# Patient Record
Sex: FEMALE | Race: BLACK OR AFRICAN AMERICAN | Employment: UNEMPLOYED | URBAN - METROPOLITAN AREA
[De-identification: names, ages, dates, MRNs, and addresses within clinical notes are randomized per-mention and may not be internally consistent; named-entity substitution may affect disease eponyms.]

---

## 2018-09-16 ENCOUNTER — HOSPITAL ENCOUNTER (EMERGENCY)
Age: 38
Discharge: HOME | End: 2018-09-16
Attending: EMERGENCY MEDICINE
Payer: MEDICARE

## 2018-09-16 ENCOUNTER — APPOINTMENT (OUTPATIENT)
Dept: GENERAL RADIOLOGY | Age: 38
End: 2018-09-16
Attending: EMERGENCY MEDICINE
Payer: MEDICARE

## 2018-09-16 VITALS
WEIGHT: 215 LBS | HEART RATE: 76 BPM | SYSTOLIC BLOOD PRESSURE: 189 MMHG | DIASTOLIC BLOOD PRESSURE: 103 MMHG | RESPIRATION RATE: 16 BRPM | TEMPERATURE: 98.2 F | HEIGHT: 64 IN | OXYGEN SATURATION: 100 % | BODY MASS INDEX: 36.7 KG/M2

## 2018-09-16 DIAGNOSIS — S86.811A PATELLAR TENDON RUPTURE, RIGHT, INITIAL ENCOUNTER: Primary | ICD-10-CM

## 2018-09-16 PROCEDURE — 73562 X-RAY EXAM OF KNEE 3: CPT

## 2018-09-16 PROCEDURE — 99284 EMERGENCY DEPT VISIT MOD MDM: CPT

## 2018-09-16 PROCEDURE — 74011250637 HC RX REV CODE- 250/637: Performed by: EMERGENCY MEDICINE

## 2018-09-16 PROCEDURE — L1830 KO IMMOB CANVAS LONG PRE OTS: HCPCS

## 2018-09-16 RX ORDER — CARVEDILOL 25 MG/1
25 TABLET ORAL 2 TIMES DAILY WITH MEALS
Status: ON HOLD | COMMUNITY
End: 2019-05-22

## 2018-09-16 RX ORDER — HYDROCODONE BITARTRATE AND ACETAMINOPHEN 5; 325 MG/1; MG/1
1 TABLET ORAL
Status: DISCONTINUED | OUTPATIENT
Start: 2018-09-16 | End: 2018-09-16 | Stop reason: HOSPADM

## 2018-09-16 RX ORDER — CARVEDILOL 12.5 MG/1
25 TABLET ORAL ONCE
Status: COMPLETED | OUTPATIENT
Start: 2018-09-16 | End: 2018-09-16

## 2018-09-16 RX ORDER — CLONIDINE HYDROCHLORIDE 0.1 MG/1
0.3 TABLET ORAL
Status: COMPLETED | OUTPATIENT
Start: 2018-09-16 | End: 2018-09-16

## 2018-09-16 RX ORDER — HYDROCODONE BITARTRATE AND ACETAMINOPHEN 5; 325 MG/1; MG/1
1 TABLET ORAL
Qty: 12 TAB | Refills: 0 | Status: SHIPPED | OUTPATIENT
Start: 2018-09-16 | End: 2018-09-17

## 2018-09-16 RX ORDER — RAMIPRIL 5 MG/1
5 CAPSULE ORAL DAILY
Status: DISCONTINUED | OUTPATIENT
Start: 2018-09-16 | End: 2018-09-16 | Stop reason: HOSPADM

## 2018-09-16 RX ADMIN — CLONIDINE HYDROCHLORIDE 0.3 MG: 0.1 TABLET ORAL at 07:31

## 2018-09-16 RX ADMIN — RAMIPRIL 5 MG: 5 CAPSULE ORAL at 07:34

## 2018-09-16 RX ADMIN — CARVEDILOL 25 MG: 12.5 TABLET, FILM COATED ORAL at 07:33

## 2018-09-16 NOTE — ED TRIAGE NOTES
Presented to ED via EMS to be evaluated for reported fall with injury to bilateral knees. Patient reports pain as documented. Patient states seen at that time at [facility] and was told to follow up with ortho. Patient denies any additional complaints at this time. Patient reports daily dialysis at home. Patient denies any complaints concerning dialysis. Patient is able to speak in complete sentences with s/s distress noted. Sepsis Screening completed (  )Patient meets SIRS criteria. ( x )Patient does not meet SIRS criteria. SIRS Criteria is achieved when two or more of the following are present ? Temperature < 96.8°F (36°C) or > 100.9°F (38.3°C) ? Heart Rate > 90 beats per minute ? Respiratory Rate > 20 breaths per minute ? WBC count > 12,000 or <4,000 or > 10% bands

## 2018-09-16 NOTE — ED PROVIDER NOTES
EMERGENCY DEPARTMENT HISTORY AND PHYSICAL EXAM 
 
Date: 9/16/2018 Patient Name: Gilmer Matos History of Presenting Illness Chief Complaint Patient presents with  Knee Pain History Provided By: Patient Chief Complaint: Bilateral knee pain Duration: 3 Days Timing:  Acute Location: Bilateral knees, worse on right Severity: 7 out of 10 Modifying Factors: No alleviation with ice. Associated Symptoms: gait problem (unable to bear weight) and bilateral knee swelling Additional History (Context):  
7:10 AM 
Gilmer Matos is a 41 y.o. female with PMHX of HTN, GERD, Lupus, RA, and ESRD who presents to the emergency department C/O bilateral knee pain (7/10) worse on the right onset 3 days ago s/p fall. Associated sxs include gait problem (unable to bear weight) and bilateral knee swelling. No alleviation with ice. Pt was seen at [facility] after the fall and had XR's at that time. States she was told she may have had a patella rupture and to follow up with an orthopedist to have an MRI. Pt is up to date with tetanus. Pt is on dialysis due to her lupus. Pt has surgery scheduled at this facility in 4 days for a \"parathyroidectomy. \" Pt has not taken her HTN medication this morning. Pt denies fever, chills, chest pain, SOB, additional falls, and any other sxs or complaints. PCP: Allyssa Wick MD 
 
Current Facility-Administered Medications Medication Dose Route Frequency Provider Last Rate Last Dose  ramipril (ALTACE) capsule 5 mg  5 mg Oral DAILY Iris Kwong MD   5 mg at 09/16/18 0734  
 HYDROcodone-acetaminophen (NORCO) 5-325 mg per tablet 1 Tab  1 Tab Oral NOW Iris Kwong MD      
 HYDROcodone-acetaminophen [facility]) 5-325 mg per tablet 1 Tab  1 Tab Oral NOW Iris Kwong MD      
 
Current Outpatient Prescriptions Medication Sig Dispense Refill  carvedilol (COREG) 25 mg tablet Take 25 mg by mouth two (2) times daily (with meals).  HYDROcodone-acetaminophen (NORCO) 5-325 mg per tablet Take 1 Tab by mouth every six (6) hours as needed for Pain for up to 3 days. Max Daily Amount: 4 Tabs. 12 Tab 0  
 sevelamer carbonate (RENVELA) 800 mg tab tab Take 800 mg by mouth three (3) times daily (with meals).  mycophenolate (CELLCEPT) 500 mg tablet Take 500 mg by mouth two (2) times a day. Indications: Lupus  omeprazole (PRILOSEC) 40 mg capsule Take 40 mg by mouth daily. Pt instructed to take am of surgery. Indications: GASTROESOPHAGEAL REFLUX  cloNIDine (CATAPRES) 0.3 mg tablet Take 0.3 mg by mouth nightly. Indications: HYPERTENSION    
 ramipril (ALTACE) 5 mg capsule Take 5 mg by mouth daily. Pt instructed to take am of surgery. Indications: HYPERTENSION  furosemide (LASIX) 40 mg tablet Take 40 mg by mouth daily. Indications: EDEMA  paricalcitol (ZEMPLAR) 2 mcg/mL injection by IntraVENous route DIALYSIS ONE TIME DOSE. Twice per week. Indications: HYPERPARATHYROIDISM SECONDARY TO CHRONIC RENAL FAILURE Past History Past Medical History: 
Past Medical History:  
Diagnosis Date  Arthritis RA  Autoimmune disease ([facility] [address] Lupus, RA  
 Bartholin cyst   
 Chronic kidney disease 2013 ESRD- related to the lupus, periteneal dialysis  GERD (gastroesophageal reflux disease)  Hypertension 2008 Past Surgical History: 
Past Surgical History:  
Procedure Laterality Date  HX OTHER SURGICAL    
 periteneal cather placement  HX OTHER SURGICAL Dialysis port placed and removed  HX OTHER SURGICAL    
 kidney biopsy  HX TONSILLECTOMY Family History: 
History reviewed. No pertinent family history. Social History: 
Social History Substance Use Topics  Smoking status: Never Smoker  Smokeless tobacco: Never Used  Alcohol use Yes Comment: once every 6 months Allergies: 
No Known Allergies Review of Systems Review of Systems Constitutional: Negative for chills and fever. Respiratory: Negative for shortness of breath. Cardiovascular: Negative for chest pain. Musculoskeletal: Positive for arthralgias (bilateral knee), gait problem (unable to bear weight) and joint swelling (bilateral knees). All other systems reviewed and are negative. Physical Exam  
 
Vitals:  
 09/16/18 0641 09/16/18 0731 09/16/18 0734 09/16/18 0830 BP: (!) 248/124 (!) 245/118 (!) 239/116 (!) 218/108 Pulse: 82 79 79 80 Resp: 14 Temp: 98.2 °F (36.8 °C) SpO2: 100%   100% Weight: 97.5 kg (215 lb) Height: 5' 4\" (1.626 m) Physical Exam  
Constitutional: She is oriented to person, place, and time. She appears well-developed and well-nourished. HENT:  
Head: Normocephalic and atraumatic. Right Ear: External ear normal.  
Left Ear: External ear normal.  
Nose: Nose normal.  
Mouth/Throat: Oropharynx is clear and moist.  
Eyes: EOM are normal. Pupils are equal, round, and reactive to light. Right conjunctiva has a hemorrhage (lateral subconjunctival hemorrhage). Neck: Normal range of motion. Neck supple. No JVD present. No tracheal deviation present. Cardiovascular: Normal rate, regular rhythm, normal heart sounds and intact distal pulses. Exam reveals no gallop and no friction rub. No murmur heard. Pulmonary/Chest: Effort normal and breath sounds normal. No respiratory distress. She has no wheezes. She has no rales. Abdominal: Soft. Bowel sounds are normal. She exhibits no distension and no mass. There is no tenderness. There is no rebound and no guarding. Musculoskeletal: Normal range of motion. She exhibits no edema or tenderness. Right knee: She exhibits swelling (anterior) and effusion. Abrasion to anterior right knee. Neurovascularly intact. Right anterior knee with effusion and tenderness. Decreased ROM secondary to pain and swelling. Neurological: She is alert and oriented to person, place, and time. She has normal reflexes. No cranial nerve deficit. She exhibits normal muscle tone. Coordination normal.  
Distal sensation intact to light touch and position sense. Skin: Skin is warm and dry. No rash noted. Psychiatric: She has a normal mood and affect. Her behavior is normal.  
Nursing note and vitals reviewed. Diagnostic Study Results Labs - No results found for this or any previous visit (from the past 12 hour(s)). Radiologic Studies -  
XR PATELLA RT 3 V Final Result IMPRESSION: 
 
 
1. Prepatellar and infrapatellar soft tissue swelling that may represent 
cellulitis in the appropriate clinical setting. 2. Nonspecific changes of the inferior patella. Diagnostic considerations 
include degenerative changes. If there is chronic infection, osteomyelitis 
should also be considered. As read by the radiologist.  
 
CT Results  (Last 48 hours) None CXR Results  (Last 48 hours) None Medications given in the ED- Medications  
ramipril (ALTACE) capsule 5 mg (5 mg Oral Given 9/16/18 0734) HYDROcodone-acetaminophen (NORCO) 5-325 mg per tablet 1 Tab (1 Tab Oral Incomplete 9/16/18 0907) HYDROcodone-acetaminophen (NORCO) 5-325 mg per tablet 1 Tab (not administered)  
cloNIDine HCl (CATAPRES) tablet 0.3 mg (0.3 mg Oral Given 9/16/18 0731)  
carvedilol (COREG) tablet 25 mg (25 mg Oral Given 9/16/18 0733) Medical Decision Making I am the first provider for this patient. I reviewed the vital signs, available nursing notes, past medical history, past surgical history, family history and social history. Vital Signs-Reviewed the patient's vital signs. Pulse Oximetry Analysis - 100% on RA Records Reviewed: Nursing Notes and Old Medical Records Provider Notes (Medical Decision Making):  
INITIAL CLINICAL IMPRESSION and PLANS: 
 The patient presents with the primary complaint(s) of: knee pain. The presentation, to include historical aspects and clinical findings are consistent with the DX of fracture. However, other possible DX's to consider as primary, associated with, or exacerbated by include: 1. Dislocation 2. Contusion 3. Effusion Considering the above, my initial management plan to evaluate and therapeutic interventions include the following and as noted in the orders: 
 
1. Imaging: XR Patelle RT 
2. Knee immobilizer and Crutches Procedures: 
Splint, Other 
Date/Time: 9/17/2018 9:10 AM 
Performed by: Jessica Menchaca Authorized by: Jessica Menchaca Consent:  
  Consent obtained:  Verbal 
  Consent given by:  Patient Pre-procedure details:  
  Sensation:  Normal 
Procedure details:  
  Laterality:  Right Location:  Knee Knee:  R knee Splint type:  Knee immobilizer Post-procedure details:  
  Pain:  Improved Sensation:  Normal 
  Patient tolerance of procedure: Tolerated well, no immediate complications Comments:  
   Patient ambulatory with right knee immobilizer and walker. ED Course:  
7:10 AM Initial assessment performed. The patients presenting problems have been discussed, and they are in agreement with the care plan formulated and outlined with them. I have encouraged them to ask questions as they arise throughout their visit. 8:44 AM Discussed patient's history, exam, and available diagnostics results with VIBHA Keita for Kolby Ventura MD, Orthopedic Surgeon, who states to place her in a knee immobilizer and giver her some crutches. Will see her in the office on 9/19 or 9/20.  
 
9:12 AM Discussed patient's history, exam, and available diagnostics results with Betzy Proctor for Kolby Ventura MD, Orthopedic Surgeon, who states to have the pt follow up in the office tomorrow. 9:20 AM Pt was unable to bear weight for an extended period of time.  Will give another dose of pain medication and reassess. 9:52 AM Pt is able to ambulate with a walker. Will discharge. Diagnosis and Disposition Discussion: 
41 y.o female presents with complaints of right knee pain. Unable to bear weight. [facility] were reviewed, noting effusion with patella tendon rupture. XR here shows similar findings of effusion and no evidence of fracture. Case was discussed with PA for Rafael Lofton MD (Orthopedic Surgeon), who was in contact with Dr. Mari Lcoo. Recommended knee immobilizer and follow up in the office tomorrow. Pt was placed in right knee immobilizer. DNVI. DISCHARGE NOTE: 
9:15 AM 
Deshawn Kingsley's  results have been reviewed with her. She has been counseled regarding her diagnosis, treatment, and plan. She verbally conveys understanding and agreement of the signs, symptoms, diagnosis, treatment and prognosis and additionally agrees to follow up as discussed. She also agrees with the care-plan and conveys that all of her questions have been answered. I have also provided discharge instructions for her that include: educational information regarding their diagnosis and treatment, and list of reasons why they would want to return to the ED prior to their follow-up appointment, should her condition change. She has been provided with education for proper emergency department utilization. CLINICAL IMPRESSION: 
 
1. Patellar tendon rupture, right, initial encounter PLAN: 
1. D/C Home 2. Current Discharge Medication List  
  
START taking these medications Details HYDROcodone-acetaminophen (NORCO) 5-325 mg per tablet Take 1 Tab by mouth every six (6) hours as needed for Pain for up to 3 days. Max Daily Amount: 4 Tabs. Qty: 12 Tab, Refills: 0 Associated Diagnoses: Patellar tendon rupture, right, initial encounter 3. Follow-up Information Follow up With Details Comments Contact Info Federica Abrams MD Go in 1 day Go to tomorrow for orthopedic follow up. [address] [facility] [address] [address] 
[phone] [facility] EMERGENCY DEPT Go to As needed, If symptoms worsen [address] [address] 
[phone]  
  
 
_______________________________ Attestations: This note is prepared by Susan Adames, acting as Scribe for Flower Munoz MD. 
 
Flower Munoz MD:  The scribe's documentation has been prepared under my direction and personally reviewed by me in its entirety. I confirm that the note above accurately reflects all work, treatment, procedures, and medical decision making performed by me. 
_______________________________

## 2018-09-16 NOTE — ED NOTES
Knee immobilizer applied to right leg. Patient was instructed on how to adjust and care for immobilizer. Patient was assisted to sitting position with legs dangling. Patient verbalized significant pain in the left knee with bending and dangling. Patient says knee immobilizer is comfortable. Patient reports she has a walker at home. This RN suggested patient use the walker for greater safety. Patient reported that she fell at [facility] while using crutches, then \"they had a big desean put me in a wheelchair and take me out\".

## 2018-09-16 NOTE — ED NOTES
Patient was assisted from stretcher to [facility] then from [facility] to toilet. Patient is unable to bear weight and slides herself from surface to surface.

## 2018-09-16 NOTE — DISCHARGE INSTRUCTIONS
Tendon Injury (Tendinopathy): Care Instructions  Your Care Instructions    Tendons are tough, flexible tissues that connect muscle to bone. A tendon can hurt or get torn from overuse or aging, especially tendons in the shoulder, elbow, wrist, hip, knee, or ankle. Tendon injuries may be called tendinopathy or tendinitis. Tendon injuries can occur from any motion you have to repeat in a job, sports, or daily activities. Tennis elbow is one common tendon injury. You can treat most tendon problems with over-the-counter pain medicine, rest, changes in your activities, and physical therapy. Follow-up care is a key part of your treatment and safety. Be sure to make and go to all appointments, and call your doctor if you are having problems. It's also a good idea to know your test results and keep a list of the medicines you take. How can you care for yourself at home? · Rest the sore area. You may have to stop doing the activity that caused the tendon pain for a while. · Take an over-the-counter pain medicine, such as acetaminophen (Tylenol), ibuprofen (Advil, Motrin), or naproxen (Aleve). Read and follow all instructions on the label. · Do not take two or more pain medicines at the same time unless the doctor told you to. Many pain medicines have acetaminophen, which is Tylenol. Too much acetaminophen (Tylenol) can be harmful. · Put ice or a cold pack on the sore area for 10 to 20 minutes at a time. Try to do this every 1 to 2 hours for the next 3 days (when you are awake) or until any swelling goes down. Put a thin cloth between the ice and your skin. · Prop up the sore area on a pillow when you ice it or anytime you sit or lie down during the next 3 days. Try to keep it above the level of your heart. This will help reduce swelling.   · Follow your doctor's advice for wearing and caring for a sling, splint, or cast. In some cases, you may wear one of these for a while to help your tendon heal.  · Follow your doctor's advice for stretching and physical therapy. Gently move your joint through its full range of motion. This will prevent stiffness in your joint. · Go back to your activity slowly. Warm up before and stretch after the activity. You also can try making some changes. For example, if a sport caused your tendon pain, alternate the sport with another activity. If using a tool causes pain, switch hands or change your . Stop the activity if it hurts. After the activity, apply ice to prevent pain and swelling. · Do not smoke. Smoking can slow healing. If you need help quitting, talk to your doctor about stop-smoking programs and medicines. These can increase your chances of quitting for good. When should you call for help? Watch closely for changes in your health, and be sure to contact your doctor if:    · Your pain gets worse.     · You do not get better as expected. Where can you learn more? Go to http://richie-maura.info/. Enter A157 in the search box to learn more about \"Tendon Injury (Tendinopathy): Care Instructions. \"  Current as of: November 29, 2017  Content Version: 11.7  © 1193-5383 [employer]. Care instructions adapted under license by [employer] (which disclaims liability or warranty for this information). If you have questions about a medical condition or this instruction, always ask your healthcare professional. [address] any warranty or liability for your use of this information.

## 2018-09-16 NOTE — ED NOTES
Band aid to the right knee removed by Dr. Claudetta Sea and replaced with abt ointment and band aid by this RN.

## 2018-09-24 ENCOUNTER — HOSPITAL ENCOUNTER (OUTPATIENT)
Dept: PREADMISSION TESTING | Age: 38
Discharge: HOME | End: 2018-09-24
Payer: MEDICARE

## 2018-09-24 LAB
ALBUMIN SERPL-MCNC: 2.4 G/DL (ref 3.4–5)
ALBUMIN/GLOB SERPL: 0.7 {RATIO} (ref 0.8–1.7)
ALP SERPL-CCNC: 535 U/L (ref 45–117)
ALT SERPL-CCNC: 13 U/L (ref 13–56)
ANION GAP SERPL CALC-SCNC: 9 MMOL/L (ref 3–18)
AST SERPL-CCNC: 13 U/L (ref 15–37)
BILIRUB SERPL-MCNC: 0.4 MG/DL (ref 0.2–1)
BUN SERPL-MCNC: 74 MG/DL (ref 7–18)
BUN/CREAT SERPL: 5 (ref 12–20)
CALCIUM SERPL-MCNC: 9.1 MG/DL (ref 8.5–10.1)
CHLORIDE SERPL-SCNC: 94 MMOL/L (ref 100–108)
CO2 SERPL-SCNC: 24 MMOL/L (ref 21–32)
CREAT SERPL-MCNC: 15.97 MG/DL (ref 0.6–1.3)
ERYTHROCYTE [DISTWIDTH] IN BLOOD BY AUTOMATED COUNT: 18.2 % (ref 11.6–14.5)
GLOBULIN SER CALC-MCNC: 3.3 G/DL (ref 2–4)
GLUCOSE SERPL-MCNC: 82 MG/DL (ref 74–99)
HCT VFR BLD AUTO: 23.2 % (ref 35–45)
HGB BLD-MCNC: 7.5 G/DL (ref 12–16)
MCH RBC QN AUTO: 27.9 PG (ref 24–34)
MCHC RBC AUTO-ENTMCNC: 32.3 G/DL (ref 31–37)
MCV RBC AUTO: 86.2 FL (ref 74–97)
PLATELET # BLD AUTO: 175 K/UL (ref 135–420)
PMV BLD AUTO: 9 FL (ref 9.2–11.8)
POTASSIUM SERPL-SCNC: 5.8 MMOL/L (ref 3.5–5.5)
PROT SERPL-MCNC: 5.7 G/DL (ref 6.4–8.2)
RBC # BLD AUTO: 2.69 M/UL (ref 4.2–5.3)
SODIUM SERPL-SCNC: 127 MMOL/L (ref 136–145)
WBC # BLD AUTO: 7 K/UL (ref 4.6–13.2)

## 2018-09-24 PROCEDURE — 85027 COMPLETE CBC AUTOMATED: CPT | Performed by: ORTHOPAEDIC SURGERY

## 2018-09-24 PROCEDURE — 36415 COLL VENOUS BLD VENIPUNCTURE: CPT | Performed by: ORTHOPAEDIC SURGERY

## 2018-09-24 PROCEDURE — 80053 COMPREHEN METABOLIC PANEL: CPT | Performed by: ORTHOPAEDIC SURGERY

## 2018-09-26 PROBLEM — N28.9 KIDNEY DISEASE: Chronic | Status: ACTIVE | Noted: 2018-09-26

## 2018-09-26 PROBLEM — S86.811A RUPTURE OF RIGHT PATELLAR TENDON: Status: ACTIVE | Noted: 2018-09-26

## 2018-09-26 PROBLEM — M06.9 RHEUMATOID ARTHRITIS (HCC): Chronic | Status: ACTIVE | Noted: 2018-09-26

## 2018-09-26 RX ORDER — DIPHENHYDRAMINE HCL 25 MG
25 CAPSULE ORAL
Status: CANCELLED | OUTPATIENT
Start: 2018-09-26

## 2018-09-26 RX ORDER — SODIUM CHLORIDE 0.9 % (FLUSH) 0.9 %
5-10 SYRINGE (ML) INJECTION EVERY 8 HOURS
Status: CANCELLED | OUTPATIENT
Start: 2018-09-26

## 2018-09-26 RX ORDER — SODIUM CHLORIDE 0.9 % (FLUSH) 0.9 %
5-10 SYRINGE (ML) INJECTION AS NEEDED
Status: CANCELLED | OUTPATIENT
Start: 2018-09-26

## 2018-09-26 NOTE — NURSE NAVIGATOR
Spoke with office concerning abnormal labs and lack of EKG. They were not sure if she had had dialysis done recently. Talked with Dr. Rico Garvey in Anesthesia and would like for her to come in to repeat labs and get EKG done today.

## 2018-09-26 NOTE — NURSE NAVIGATOR
Telephone call received by Vikram) from Dr. Michelle Osman office. Patient does dialysis daily. She is unable to come in to repeat labs. Will arrive at 8 am in morning to have repeat labs.  Aware that case may cancel if abnormal.

## 2018-09-26 NOTE — H&P
History and Physical        Patient: Naheed Rogers               Sex: female          DOA: (Not on file)         YOB: 1980      Age:  41 y.o.        LOS:  LOS: 0 days        HPI:     Joshua Clark is a 41year old right handed  female referred here today for evaluation and treatment of a right knee probable infrapatellar tendon rupture. She did this from a fall, landing on both knees in an outstretched fashion. She sprained the left knee and has ruptured the infrapatellar tendon on the right according to the Emergency Room. She is in a knee immobilizer today and is in the wheelchair. X-rays were reviewed from outside of the right knee show the patient has disrupted the patella tendon off the patella. There appears to be a sleeve of bone that is still with the patella tendon that has come off the patella bone itself. Past Medical History:   Diagnosis Date    Arthritis     RA    Autoimmune disease ([facility] [address]     Lupus, RA    Bartholin cyst     Chronic kidney disease 2013    ESRD- related to the lupus, periteneal dialysis    GERD (gastroesophageal reflux disease)     Hypertension 2008       Past Surgical History:   Procedure Laterality Date    HX OTHER SURGICAL      periteneal cather placement    HX OTHER SURGICAL      Dialysis port placed and removed    HX OTHER SURGICAL      kidney biopsy    HX TONSILLECTOMY         No family history on file. Social History     Social History    Marital status:      Spouse name: N/A    Number of children: N/A    Years of education: N/A     Social History Main Topics    Smoking status: Never Smoker    Smokeless tobacco: Never Used    Alcohol use Yes      Comment: once every 6 months    Drug use: No    Sexual activity: Not on file     Other Topics Concern    Not on file     Social History Narrative       Prior to Admission medications    Medication Sig Start Date End Date Taking?  Authorizing Provider   calcitRIOL (ROCALTROL) 0.5 mcg capsule Take 0.5 mcg by mouth daily. Historical Provider   carvedilol (COREG) 25 mg tablet Take 25 mg by mouth two (2) times daily (with meals). Сергей Shah MD   sevelamer carbonate (RENVELA) 800 mg tab tab Take 800 mg by mouth three (3) times daily (with meals). Historical Provider   mycophenolate (CELLCEPT) 500 mg tablet Take 500 mg by mouth two (2) times a day. Indications: Lupus    Historical Provider   omeprazole (PRILOSEC) 40 mg capsule Take 40 mg by mouth daily. Pt instructed to take am of surgery. Indications: GASTROESOPHAGEAL REFLUX    Historical Provider   cloNIDine (CATAPRES) 0.3 mg tablet Take 0.3 mg by mouth two (2) times a day. Indications: hypertension    Historical Provider   ramipril (ALTACE) 5 mg capsule Take 5 mg by mouth daily. Indications: hypertension    Historical Provider   furosemide (LASIX) 40 mg tablet Take 40 mg by mouth daily. Indications: EDEMA    Historical Provider       No Known Allergies    Review of Systems    Pertinent positives include double vision, joint pain and rheumatoid arthritis.   Pertinent negatives include blurred vision, chest pain, chills, cold, discharge of the eyes, dizziness, fever, headache, hearing loss, heart murmur, itching of the eyes, palpitations, redness of the eyes, rheumatic fever, ringing in ears, sore throat/hoarseness, weight change, abdominal pain, anxiety, bipolar disorder, bladder/kidney infection, bloody stool, blood in urine, burning sensation, changes in mood, chronic cough, depression, diarrhea, difficulty breathing, difficulty swallowing, fainting, frequent urinating, fracture/dislocation, gas/bloating, gout, hemorrhoids, incontinence, joint stiffness, loss of balance, memory loss, muscle weakness, nausea/vomiting, numbness/tingling, pain on breathing, painful urination, psoriasis, rash/itching, Raynaud's phenomenon, seizure disorder, shortness of breath, sprain/strain, swelling of feet, tendonitis, varicose veins and wheezing. Physical Exam:      There were no vitals taken for this visit. Physical Exam:  Physical exam shows a healthy appearing middle aged [facility] female. Her right knee exam today the patient's abrasion in the front has really healed nicely and there is no exposed skin. She has the obvious prominence of the superiorly positioned patella with a lot of swelling in the prepatellar region. She cannot do a straight leg raise today. Assessment/Plan     Principal Problem:    Rupture of right patellar tendon (9/26/2018)    Active Problems:    Hypertension (3/27/2014)      Obesity (3/27/2014)      Kidney disease (9/26/2018)      Rheumatoid arthritis ([facility] [address] (9/26/2018)      Dr. Elbert Malin scheduled her for a right knee infrapatellar tendon repair and he believes we will end up doing the Krackow stitch through the infrapatellar tendon and putting through drill holes of the patella. He talked to her about using a brace afterwards, range of motion and physical therapy, etc.  He talked to the patient about the risks, the alternatives, and the benefits including infection, pain, bleeding, and she understands these and is willing to proceed. He issued Norco and Keflex for perioperative use and he will see her one week postop. We will have Arthrex available in case he decides to use anchors.

## 2018-09-27 ENCOUNTER — HOSPITAL ENCOUNTER (OUTPATIENT)
Age: 38
Setting detail: OUTPATIENT SURGERY
Discharge: HOME | End: 2018-09-27
Attending: ORTHOPAEDIC SURGERY | Admitting: ORTHOPAEDIC SURGERY
Payer: MEDICARE

## 2018-09-27 VITALS
SYSTOLIC BLOOD PRESSURE: 198 MMHG | HEART RATE: 81 BPM | TEMPERATURE: 97.8 F | DIASTOLIC BLOOD PRESSURE: 104 MMHG | OXYGEN SATURATION: 100 % | HEIGHT: 64 IN | BODY MASS INDEX: 37.05 KG/M2 | WEIGHT: 217 LBS

## 2018-09-27 LAB
ALBUMIN SERPL-MCNC: 2.3 G/DL (ref 3.4–5)
ALBUMIN/GLOB SERPL: 0.7 {RATIO} (ref 0.8–1.7)
ALP SERPL-CCNC: 478 U/L (ref 45–117)
ALT SERPL-CCNC: 13 U/L (ref 13–56)
ANION GAP SERPL CALC-SCNC: 11 MMOL/L (ref 3–18)
AST SERPL-CCNC: 18 U/L (ref 15–37)
BASOPHILS # BLD: 0 K/UL (ref 0–0.1)
BASOPHILS NFR BLD: 0 % (ref 0–2)
BILIRUB SERPL-MCNC: 0.3 MG/DL (ref 0.2–1)
BUN SERPL-MCNC: 72 MG/DL (ref 7–18)
BUN/CREAT SERPL: 5 (ref 12–20)
CALCIUM SERPL-MCNC: 8.5 MG/DL (ref 8.5–10.1)
CHLORIDE SERPL-SCNC: 95 MMOL/L (ref 100–108)
CO2 SERPL-SCNC: 23 MMOL/L (ref 21–32)
CREAT SERPL-MCNC: 15.09 MG/DL (ref 0.6–1.3)
DIFFERENTIAL METHOD BLD: ABNORMAL
EOSINOPHIL # BLD: 0.1 K/UL (ref 0–0.4)
EOSINOPHIL NFR BLD: 1 % (ref 0–5)
ERYTHROCYTE [DISTWIDTH] IN BLOOD BY AUTOMATED COUNT: 17.7 % (ref 11.6–14.5)
GLOBULIN SER CALC-MCNC: 3.4 G/DL (ref 2–4)
GLUCOSE SERPL-MCNC: 98 MG/DL (ref 74–99)
HCG UR QL: NEGATIVE
HCT VFR BLD AUTO: 22.5 % (ref 35–45)
HGB BLD-MCNC: 7.3 G/DL (ref 12–16)
LYMPHOCYTES # BLD: 1 K/UL (ref 0.9–3.6)
LYMPHOCYTES NFR BLD: 13 % (ref 21–52)
MCH RBC QN AUTO: 28 PG (ref 24–34)
MCHC RBC AUTO-ENTMCNC: 32.4 G/DL (ref 31–37)
MCV RBC AUTO: 86.2 FL (ref 74–97)
MONOCYTES # BLD: 0.5 K/UL (ref 0.05–1.2)
MONOCYTES NFR BLD: 6 % (ref 3–10)
NEUTS SEG # BLD: 6.4 K/UL (ref 1.8–8)
NEUTS SEG NFR BLD: 80 % (ref 40–73)
PLATELET # BLD AUTO: 190 K/UL (ref 135–420)
PMV BLD AUTO: 9 FL (ref 9.2–11.8)
POTASSIUM SERPL-SCNC: 5.7 MMOL/L (ref 3.5–5.5)
PROT SERPL-MCNC: 5.7 G/DL (ref 6.4–8.2)
RBC # BLD AUTO: 2.61 M/UL (ref 4.2–5.3)
RBC MORPH BLD: ABNORMAL
SODIUM SERPL-SCNC: 129 MMOL/L (ref 136–145)
WBC # BLD AUTO: 8 K/UL (ref 4.6–13.2)

## 2018-09-27 PROCEDURE — 80053 COMPREHEN METABOLIC PANEL: CPT | Performed by: ORTHOPAEDIC SURGERY

## 2018-09-27 PROCEDURE — 93005 ELECTROCARDIOGRAM TRACING: CPT

## 2018-09-27 PROCEDURE — 81025 URINE PREGNANCY TEST: CPT

## 2018-09-27 PROCEDURE — 85025 COMPLETE CBC W/AUTO DIFF WBC: CPT | Performed by: ORTHOPAEDIC SURGERY

## 2018-09-27 PROCEDURE — 36415 COLL VENOUS BLD VENIPUNCTURE: CPT | Performed by: ORTHOPAEDIC SURGERY

## 2018-09-27 RX ORDER — SODIUM CHLORIDE 0.9 % (FLUSH) 0.9 %
5-10 SYRINGE (ML) INJECTION AS NEEDED
Status: DISCONTINUED | OUTPATIENT
Start: 2018-09-27 | End: 2018-09-27 | Stop reason: HOSPADM

## 2018-09-27 RX ORDER — SODIUM CHLORIDE, SODIUM LACTATE, POTASSIUM CHLORIDE, CALCIUM CHLORIDE 600; 310; 30; 20 MG/100ML; MG/100ML; MG/100ML; MG/100ML
125 INJECTION, SOLUTION INTRAVENOUS CONTINUOUS
Status: DISCONTINUED | OUTPATIENT
Start: 2018-09-27 | End: 2018-09-27 | Stop reason: HOSPADM

## 2018-09-27 RX ORDER — SODIUM CHLORIDE 0.9 % (FLUSH) 0.9 %
5-10 SYRINGE (ML) INJECTION EVERY 8 HOURS
Status: DISCONTINUED | OUTPATIENT
Start: 2018-09-27 | End: 2018-09-27 | Stop reason: HOSPADM

## 2018-09-27 RX ORDER — CEFAZOLIN SODIUM 2 G/50ML
2 SOLUTION INTRAVENOUS ONCE
Status: DISCONTINUED | OUTPATIENT
Start: 2018-09-27 | End: 2018-09-27 | Stop reason: HOSPADM

## 2018-09-27 NOTE — PERIOP NOTES
Dale Cooper spoke with patient at bedside. Advised patient to follow up with PCP and nephrology. Patient to call office to reschedule.      Patient armband removed and shredded

## 2018-09-27 NOTE — PROGRESS NOTES
Discussed case with patient and Dr. Carmina Hancock. Patient needs to be medically optimized prior to surgery. Will call PCP and Nephrologist.  Patient will follow up with nephrology today or tomorrow to discuss.   I set patient up with an appointment to see nephrologist tomorrow at 11 am.

## 2018-09-27 NOTE — PROGRESS NOTES
Discussed case with patient and Dr. Michelle Osman. Patient needs to be medically optimized prior to surgery. Will call PCP and Nephrologist.  Patient will follow up with nephrology today or tomorrow to discuss.

## 2018-09-27 NOTE — PERIOP NOTES
Nate Limb in pre-op holding made aware of patient's elevated blood pressures.  Will discuss with Anesthesia

## 2018-09-27 NOTE — PERIOP NOTES
Discussed patient disposition with Ruth Ann Berg RN. Patient will not have surgery today. Ottie Frankel, [place] will be down to talk to patient.

## 2018-09-28 LAB
ATRIAL RATE: 79 BPM
CALCULATED P AXIS, ECG09: 65 DEGREES
CALCULATED R AXIS, ECG10: 122 DEGREES
CALCULATED T AXIS, ECG11: -50 DEGREES
DIAGNOSIS, 93000: NORMAL
P-R INTERVAL, ECG05: 160 MS
Q-T INTERVAL, ECG07: 386 MS
QRS DURATION, ECG06: 102 MS
QTC CALCULATION (BEZET), ECG08: 442 MS
VENTRICULAR RATE, ECG03: 79 BPM

## 2018-10-09 NOTE — H&P
History and Physical        Patient: Cecilio Lanes               Sex: female          DOA: (Not on file)         YOB: 1980      Age:  41 y.o.        LOS:  LOS: 0 days        HPI:     Amber Hansen is in for followup of her right knee infrapatellar tendon rupture with a sleeve of patellar bone. The patient is here today for followup. It has been about a week since this has happened now and the pain itself is less than what it was. She is still wearing her knee immobilizer. X-rays were reviewed from outside of the right knee show the patient has disrupted the patella tendon off the patella. There appears to be a sleeve of bone that is still with the patella tendon that has come off the patella bone itself. She was scheduled previously but had to be cancelled twice because of clearance issues. She has seen her nephrologist who has cleared her for surgery. Past Medical History:   Diagnosis Date    Arthritis     RA    Autoimmune disease ([facility] [address]     Lupus, RA    Bartholin cyst     Chronic kidney disease 2013    ESRD- related to the lupus, periteneal dialysis    GERD (gastroesophageal reflux disease)     Hypertension 2008       Past Surgical History:   Procedure Laterality Date    HX OTHER SURGICAL      periteneal cather placement    HX OTHER SURGICAL      Dialysis port placed and removed    HX OTHER SURGICAL      kidney biopsy    HX TONSILLECTOMY         No family history on file. Social History     Social History    Marital status:      Spouse name: N/A    Number of children: N/A    Years of education: N/A     Social History Main Topics    Smoking status: Never Smoker    Smokeless tobacco: Never Used    Alcohol use Yes      Comment: once every 6 months    Drug use: No    Sexual activity: Not on file     Other Topics Concern    Not on file     Social History Narrative       Prior to Admission medications    Medication Sig Start Date End Date Taking?  Authorizing Provider   calcitRIOL (ROCALTROL) 0.5 mcg capsule Take 0.5 mcg by mouth daily. Historical Provider   carvedilol (COREG) 25 mg tablet Take 25 mg by mouth two (2) times daily (with meals). Сергей Shah, MD   sevelamer carbonate (RENVELA) 800 mg tab tab Take 800 mg by mouth three (3) times daily (with meals). Historical Provider   mycophenolate (CELLCEPT) 500 mg tablet Take 500 mg by mouth two (2) times a day. Indications: Lupus    Historical Provider   omeprazole (PRILOSEC) 40 mg capsule Take 40 mg by mouth daily. Pt instructed to take am of surgery. Indications: GASTROESOPHAGEAL REFLUX    Historical Provider   cloNIDine (CATAPRES) 0.3 mg tablet Take 0.3 mg by mouth two (2) times a day. Indications: hypertension    Historical Provider   ramipril (ALTACE) 5 mg capsule Take 5 mg by mouth daily. Indications: hypertension    Historical Provider   furosemide (LASIX) 40 mg tablet Take 40 mg by mouth daily. Indications: EDEMA    Historical Provider       No Known Allergies    Review of Systems    Pertinent positives include double vision, joint pain and rheumatoid arthritis.   Pertinent negatives include blurred vision, chest pain, chills, cold, discharge of the eyes, dizziness, fever, headache, hearing loss, heart murmur, itching of the eyes, palpitations, redness of the eyes, rheumatic fever, ringing in ears, sore throat/hoarseness, weight change, abdominal pain, anxiety, bipolar disorder, bladder/kidney infection, bloody stool, blood in urine, burning sensation, changes in mood, chronic cough, depression, diarrhea, difficulty breathing, difficulty swallowing, fainting, frequent urinating, fracture/dislocation, gas/bloating, gout, hemorrhoids, incontinence, joint stiffness, loss of balance, memory loss, muscle weakness, nausea/vomiting, numbness/tingling, pain on breathing, painful urination, psoriasis, rash/itching, Raynaud's phenomenon, seizure disorder, shortness of breath, sprain/strain, swelling of feet, tendonitis, varicose veins and wheezing. Physical Exam:      There were no vitals taken for this visit. Physical Exam:  Physical exam shows a healthy appearing middle aged [place] American female. Her right knee exam today the patient's abrasion in the front has really healed nicely and there is no exposed skin. She has the obvious prominence of the superiorly positioned patella with a lot of swelling in the prepatellar region. She cannot do a straight leg raise today. Assessment/Plan     Principal Problem:    Rupture of right patellar tendon (9/26/2018)    Active Problems:    Hypertension (3/27/2014)      Bartholin's gland abscess (3/27/2014)      Renal failure (3/27/2014)      Obesity (3/27/2014)      Kidney disease (9/26/2018)      [facility] ([facility] (9/26/2018)      Dr. Bobbi Hernandez scheduled her for a right knee infrapatellar tendon repair and he believes we will end up doing the Krackow stitch through the infrapatellar tendon and putting through dill holes of the patella. He talked to her about using a brace afterwards, range of motion and physical therapy, etc.  He talked to the patient about the risks, the alternatives, and the benefits including infection, pain, bleeding, and she understands these and is willing to proceed. He issued Norco and Keflex for perioperative use and he will see her one week postop. He may use Arthrex anchors as well.

## 2018-10-10 ENCOUNTER — ANESTHESIA EVENT (OUTPATIENT)
Dept: SURGERY | Age: 38
End: 2018-10-10
Payer: MEDICARE

## 2018-10-10 RX ORDER — SODIUM CHLORIDE 0.9 % (FLUSH) 0.9 %
5-10 SYRINGE (ML) INJECTION EVERY 8 HOURS
Status: CANCELLED | OUTPATIENT
Start: 2018-10-10

## 2018-10-10 RX ORDER — SODIUM CHLORIDE, SODIUM LACTATE, POTASSIUM CHLORIDE, CALCIUM CHLORIDE 600; 310; 30; 20 MG/100ML; MG/100ML; MG/100ML; MG/100ML
125 INJECTION, SOLUTION INTRAVENOUS CONTINUOUS
Status: CANCELLED | OUTPATIENT
Start: 2018-10-10 | End: 2018-10-11

## 2018-10-10 RX ORDER — DIPHENHYDRAMINE HCL 25 MG
25 CAPSULE ORAL
Status: CANCELLED | OUTPATIENT
Start: 2018-10-10

## 2018-10-10 RX ORDER — SODIUM CHLORIDE 0.9 % (FLUSH) 0.9 %
5-10 SYRINGE (ML) INJECTION AS NEEDED
Status: CANCELLED | OUTPATIENT
Start: 2018-10-10

## 2018-10-11 ENCOUNTER — HOSPITAL ENCOUNTER (OUTPATIENT)
Age: 38
Setting detail: OUTPATIENT SURGERY
Discharge: HOME | End: 2018-10-11
Attending: ORTHOPAEDIC SURGERY | Admitting: ORTHOPAEDIC SURGERY
Payer: MEDICARE

## 2018-10-11 ENCOUNTER — ANESTHESIA (OUTPATIENT)
Dept: SURGERY | Age: 38
End: 2018-10-11
Payer: MEDICARE

## 2018-10-11 VITALS
RESPIRATION RATE: 18 BRPM | DIASTOLIC BLOOD PRESSURE: 69 MMHG | HEART RATE: 84 BPM | OXYGEN SATURATION: 100 % | SYSTOLIC BLOOD PRESSURE: 133 MMHG | BODY MASS INDEX: 36.7 KG/M2 | WEIGHT: 214.95 LBS | TEMPERATURE: 97.8 F | HEIGHT: 64 IN

## 2018-10-11 DIAGNOSIS — S86.811D PATELLAR TENDON RUPTURE, RIGHT, SUBSEQUENT ENCOUNTER: Primary | ICD-10-CM

## 2018-10-11 LAB
HCG UR QL: NEGATIVE
POTASSIUM SERPL-SCNC: 4.7 MMOL/L (ref 3.5–5.5)

## 2018-10-11 PROCEDURE — 77030018790 HC NDL SUT ASPE -A: Performed by: ORTHOPAEDIC SURGERY

## 2018-10-11 PROCEDURE — 77030031139 HC SUT VCRL2 J&J -A: Performed by: ORTHOPAEDIC SURGERY

## 2018-10-11 PROCEDURE — 81025 URINE PREGNANCY TEST: CPT

## 2018-10-11 PROCEDURE — 36415 COLL VENOUS BLD VENIPUNCTURE: CPT | Performed by: ANESTHESIOLOGY

## 2018-10-11 PROCEDURE — 77030032490 HC SLV COMPR SCD KNE COVD -B: Performed by: ORTHOPAEDIC SURGERY

## 2018-10-11 PROCEDURE — 77030020782 HC GWN BAIR PAWS FLX 3M -B: Performed by: ORTHOPAEDIC SURGERY

## 2018-10-11 PROCEDURE — 74011250637 HC RX REV CODE- 250/637: Performed by: SPECIALIST

## 2018-10-11 PROCEDURE — 74011000250 HC RX REV CODE- 250

## 2018-10-11 PROCEDURE — 77030012508 HC MSK AIRWY LMA AMBU -A: Performed by: ANESTHESIOLOGY

## 2018-10-11 PROCEDURE — 77030018552 HC SUT FBRTAPE1 ARTH -B: Performed by: ORTHOPAEDIC SURGERY

## 2018-10-11 PROCEDURE — 74011250636 HC RX REV CODE- 250/636: Performed by: ANESTHESIOLOGY

## 2018-10-11 PROCEDURE — 74011250636 HC RX REV CODE- 250/636: Performed by: PHYSICIAN ASSISTANT

## 2018-10-11 PROCEDURE — 74011000250 HC RX REV CODE- 250: Performed by: ORTHOPAEDIC SURGERY

## 2018-10-11 PROCEDURE — 77030002912 HC SUT ETHBND J&J -A: Performed by: ORTHOPAEDIC SURGERY

## 2018-10-11 PROCEDURE — 77030020754 HC CUF TRNQT 2BLA STRY -B: Performed by: ORTHOPAEDIC SURGERY

## 2018-10-11 PROCEDURE — 84132 ASSAY OF SERUM POTASSIUM: CPT | Performed by: ANESTHESIOLOGY

## 2018-10-11 PROCEDURE — 76010000149 HC OR TIME 1 TO 1.5 HR: Performed by: ORTHOPAEDIC SURGERY

## 2018-10-11 PROCEDURE — 76210000016 HC OR PH I REC 1 TO 1.5 HR: Performed by: ORTHOPAEDIC SURGERY

## 2018-10-11 PROCEDURE — 76210000026 HC REC RM PH II 1 TO 1.5 HR: Performed by: ORTHOPAEDIC SURGERY

## 2018-10-11 PROCEDURE — 77030002986 HC SUT PROL J&J -A: Performed by: ORTHOPAEDIC SURGERY

## 2018-10-11 PROCEDURE — 76060000033 HC ANESTHESIA 1 TO 1.5 HR: Performed by: ORTHOPAEDIC SURGERY

## 2018-10-11 PROCEDURE — 74011000272 HC RX REV CODE- 272: Performed by: ORTHOPAEDIC SURGERY

## 2018-10-11 PROCEDURE — C1713 ANCHOR/SCREW BN/BN,TIS/BN: HCPCS | Performed by: ORTHOPAEDIC SURGERY

## 2018-10-11 PROCEDURE — 74011250636 HC RX REV CODE- 250/636

## 2018-10-11 DEVICE — IMP SYS INTBRACE KNEE LIG AUGMENT REPR
Type: IMPLANTABLE DEVICE | Site: KNEE | Status: FUNCTIONAL
Brand: ARTHREX®

## 2018-10-11 RX ORDER — MINOXIDIL 10 MG/1
5 TABLET ORAL 2 TIMES DAILY
Status: ON HOLD | COMMUNITY
End: 2019-05-22

## 2018-10-11 RX ORDER — GLYCOPYRROLATE 0.2 MG/ML
INJECTION INTRAMUSCULAR; INTRAVENOUS AS NEEDED
Status: DISCONTINUED | OUTPATIENT
Start: 2018-10-11 | End: 2018-10-11 | Stop reason: HOSPADM

## 2018-10-11 RX ORDER — CEFAZOLIN SODIUM/WATER 2 G/20 ML
2 SYRINGE (ML) INTRAVENOUS ONCE
Status: COMPLETED | OUTPATIENT
Start: 2018-10-11 | End: 2018-10-11

## 2018-10-11 RX ORDER — HYDROMORPHONE HYDROCHLORIDE 2 MG/ML
INJECTION, SOLUTION INTRAMUSCULAR; INTRAVENOUS; SUBCUTANEOUS AS NEEDED
Status: DISCONTINUED | OUTPATIENT
Start: 2018-10-11 | End: 2018-10-11 | Stop reason: HOSPADM

## 2018-10-11 RX ORDER — ONDANSETRON 2 MG/ML
INJECTION INTRAMUSCULAR; INTRAVENOUS AS NEEDED
Status: DISCONTINUED | OUTPATIENT
Start: 2018-10-11 | End: 2018-10-11 | Stop reason: HOSPADM

## 2018-10-11 RX ORDER — PROPOFOL 10 MG/ML
INJECTION, EMULSION INTRAVENOUS AS NEEDED
Status: DISCONTINUED | OUTPATIENT
Start: 2018-10-11 | End: 2018-10-11 | Stop reason: HOSPADM

## 2018-10-11 RX ORDER — SUCCINYLCHOLINE CHLORIDE 20 MG/ML
INJECTION INTRAMUSCULAR; INTRAVENOUS AS NEEDED
Status: DISCONTINUED | OUTPATIENT
Start: 2018-10-11 | End: 2018-10-11 | Stop reason: HOSPADM

## 2018-10-11 RX ORDER — OXYCODONE HYDROCHLORIDE 5 MG/1
5-10 TABLET ORAL
Qty: 60 TAB | Refills: 0 | Status: SHIPPED | OUTPATIENT
Start: 2018-10-11

## 2018-10-11 RX ORDER — OXYCODONE HYDROCHLORIDE 5 MG/1
10 TABLET ORAL
Status: COMPLETED | OUTPATIENT
Start: 2018-10-11 | End: 2018-10-11

## 2018-10-11 RX ORDER — SODIUM CHLORIDE 9 MG/ML
500 INJECTION, SOLUTION INTRAVENOUS CONTINUOUS
Status: DISCONTINUED | OUTPATIENT
Start: 2018-10-11 | End: 2018-10-11 | Stop reason: HOSPADM

## 2018-10-11 RX ORDER — SODIUM CHLORIDE 0.9 % (FLUSH) 0.9 %
5-10 SYRINGE (ML) INJECTION AS NEEDED
Status: DISCONTINUED | OUTPATIENT
Start: 2018-10-11 | End: 2018-10-11 | Stop reason: HOSPADM

## 2018-10-11 RX ORDER — LIDOCAINE HYDROCHLORIDE 20 MG/ML
INJECTION, SOLUTION EPIDURAL; INFILTRATION; INTRACAUDAL; PERINEURAL AS NEEDED
Status: DISCONTINUED | OUTPATIENT
Start: 2018-10-11 | End: 2018-10-11 | Stop reason: HOSPADM

## 2018-10-11 RX ORDER — FENTANYL CITRATE 50 UG/ML
50 INJECTION, SOLUTION INTRAMUSCULAR; INTRAVENOUS
Status: COMPLETED | OUTPATIENT
Start: 2018-10-11 | End: 2018-10-11

## 2018-10-11 RX ORDER — BUPIVACAINE HYDROCHLORIDE AND EPINEPHRINE 2.5; 5 MG/ML; UG/ML
INJECTION, SOLUTION EPIDURAL; INFILTRATION; INTRACAUDAL; PERINEURAL AS NEEDED
Status: DISCONTINUED | OUTPATIENT
Start: 2018-10-11 | End: 2018-10-11 | Stop reason: HOSPADM

## 2018-10-11 RX ORDER — SODIUM CHLORIDE, SODIUM LACTATE, POTASSIUM CHLORIDE, CALCIUM CHLORIDE 600; 310; 30; 20 MG/100ML; MG/100ML; MG/100ML; MG/100ML
125 INJECTION, SOLUTION INTRAVENOUS CONTINUOUS
Status: DISCONTINUED | OUTPATIENT
Start: 2018-10-11 | End: 2018-10-11 | Stop reason: HOSPADM

## 2018-10-11 RX ORDER — CEPHALEXIN 500 MG/1
500 CAPSULE ORAL 4 TIMES DAILY
Qty: 4 CAP | Refills: 0 | Status: SHIPPED | OUTPATIENT
Start: 2018-10-11 | End: 2018-10-12

## 2018-10-11 RX ORDER — MIDAZOLAM HYDROCHLORIDE 1 MG/ML
INJECTION, SOLUTION INTRAMUSCULAR; INTRAVENOUS AS NEEDED
Status: DISCONTINUED | OUTPATIENT
Start: 2018-10-11 | End: 2018-10-11 | Stop reason: HOSPADM

## 2018-10-11 RX ORDER — FENTANYL CITRATE 50 UG/ML
INJECTION, SOLUTION INTRAMUSCULAR; INTRAVENOUS AS NEEDED
Status: DISCONTINUED | OUTPATIENT
Start: 2018-10-11 | End: 2018-10-11 | Stop reason: HOSPADM

## 2018-10-11 RX ADMIN — FENTANYL CITRATE 50 MCG: 50 INJECTION, SOLUTION INTRAMUSCULAR; INTRAVENOUS at 14:30

## 2018-10-11 RX ADMIN — OXYCODONE HYDROCHLORIDE 10 MG: 5 TABLET ORAL at 15:51

## 2018-10-11 RX ADMIN — FENTANYL CITRATE 50 MCG: 50 INJECTION, SOLUTION INTRAMUSCULAR; INTRAVENOUS at 14:50

## 2018-10-11 RX ADMIN — PROPOFOL 150 MG: 10 INJECTION, EMULSION INTRAVENOUS at 12:48

## 2018-10-11 RX ADMIN — HYDROMORPHONE HYDROCHLORIDE 1 MG: 2 INJECTION, SOLUTION INTRAMUSCULAR; INTRAVENOUS; SUBCUTANEOUS at 13:23

## 2018-10-11 RX ADMIN — FENTANYL CITRATE 50 MCG: 50 INJECTION, SOLUTION INTRAMUSCULAR; INTRAVENOUS at 12:39

## 2018-10-11 RX ADMIN — ONDANSETRON 4 MG: 2 INJECTION INTRAMUSCULAR; INTRAVENOUS at 13:01

## 2018-10-11 RX ADMIN — SODIUM CHLORIDE 500 ML: 900 INJECTION, SOLUTION INTRAVENOUS at 11:46

## 2018-10-11 RX ADMIN — FENTANYL CITRATE 50 MCG: 50 INJECTION, SOLUTION INTRAMUSCULAR; INTRAVENOUS at 15:14

## 2018-10-11 RX ADMIN — GLYCOPYRROLATE 0.2 MG: 0.2 INJECTION INTRAMUSCULAR; INTRAVENOUS at 12:47

## 2018-10-11 RX ADMIN — FENTANYL CITRATE 50 MCG: 50 INJECTION, SOLUTION INTRAMUSCULAR; INTRAVENOUS at 15:19

## 2018-10-11 RX ADMIN — Medication 2 G: at 12:59

## 2018-10-11 RX ADMIN — LIDOCAINE HYDROCHLORIDE 80 MG: 20 INJECTION, SOLUTION EPIDURAL; INFILTRATION; INTRACAUDAL; PERINEURAL at 12:44

## 2018-10-11 RX ADMIN — FENTANYL CITRATE 50 MCG: 50 INJECTION, SOLUTION INTRAMUSCULAR; INTRAVENOUS at 12:44

## 2018-10-11 RX ADMIN — FENTANYL CITRATE 50 MCG: 50 INJECTION, SOLUTION INTRAMUSCULAR; INTRAVENOUS at 14:20

## 2018-10-11 RX ADMIN — FENTANYL CITRATE 50 MCG: 50 INJECTION, SOLUTION INTRAMUSCULAR; INTRAVENOUS at 14:25

## 2018-10-11 RX ADMIN — PROPOFOL 200 MG: 10 INJECTION, EMULSION INTRAVENOUS at 12:44

## 2018-10-11 RX ADMIN — SUCCINYLCHOLINE CHLORIDE 30 MG: 20 INJECTION INTRAMUSCULAR; INTRAVENOUS at 12:51

## 2018-10-11 RX ADMIN — MIDAZOLAM HYDROCHLORIDE 2 MG: 1 INJECTION, SOLUTION INTRAMUSCULAR; INTRAVENOUS at 12:39

## 2018-10-11 NOTE — PERIOP NOTES
Reviewed PTA medication list with patient/caregiver and patient/caregiver denies any additional medications. Patient admits to having a responsible adult care for them for at least 24 hours after surgery.     Dual skin assessment completed by Adolfo Vora RN and Lexi Borges RN.

## 2018-10-11 NOTE — INTERVAL H&P NOTE
H&P Update:  Scottie Patton was seen and examined. History and physical has been reviewed. The patient has been examined. There have been no significant clinical changes since the completion of the originally dated History and Physical.  Patient identified by surgeon; surgical site was confirmed by patient and surgeon.     Signed By: Malka Cobian MD     October 11, 2018 12:34 PM

## 2018-10-11 NOTE — DISCHARGE INSTRUCTIONS
Alona Soulier, III, MD Patric Dubois, PA-C    Lower Extremity Surgery  Discharge Instructions      Please take the time to review the following instructions before you leave the hospital and use them as guidelines during your recovery from surgery. If you have any questions you may contact my office at [phone]. Wound Care/Dressing Changes:    []   Keep the surgical dressing on for two days from the day of your surgery. Once you remove this, no dressing is necessary if there is no drainage. [x]   You may change your dressing as needed. Beginning the 2 days after you are discharged from the hospital you can change your dressing daily. A big, bulky dressing isn't necessary as long as there isn't any drainage from the incisions. You can put a band-aid or a pice of gauze over each incision and wear an ACE bandage as needed for comfort and swelling. []   Don't remove your dressing or get them wet. · It isn't necessary to apply antibiotic ointment to your incisions. Sutures will be removed at your one week post-op visit. Staples (if you have them) are removed in two weeks. If you have steri-strips over your incision they will start to peel off in 7-10 days as you get them wet. They don't need to be removed prior to that. When they begin to peel off you can remove them. They should all be removed by 14 days from your surgery. If your wound is closed with Dermabond nothing has to be done or removed. Showering/Bathing:    [x]   You may shower 2 days after surgery. Your dressing may be removed for showering. You may get your incisions wet in the shower. Don't vigorously scrub the area where your incisions are. Apply a clean, dry dressing after drying off the area of your incisions. Don't take a tub bath, get in a swimming pool or Jacuzzi until the incisions are completely healed. Do not soak your incisions under water. []   Do not get the dressing wet.   Once you remove it two days from surgery, you may get the incision wet if there is no drainage. Weight Bearing Status/Braces/Activity:    []   You may walk as tolerated and perform your normal daily activities. Use crutches, a walker, or a cane only if you need them. You should strive to achieve full range of motion in your knee as soon as possible. Please start using a stationary bike or walking for exercise 3 or 4 days after surgery. We would like for you to return to your normal activities as soon as possible. If you feel comfortable returning to work, you may do so at any time. [x]   Weight bearing as tolerated with the knee immobilizer in place. Use crutches, cane, or walker as needed. You should sleep in your knee immobilizer. []   Non-weight bearing. Please do not bear any weight on your leg. You may use your toes for balance when walking with a cane or crutches. Ice/Elevation:    Continue ice and elevation consistently for 48 hours after surgery. When elevating your knee elevate it on about 4 pillows to be sure it is above your heart. After 48 hours, you should ice your knee 3 times per day, for 20 minutes at a time for the next 5 days. After one week from surgery, you may use ice and elevation as needed for pain and swelling. Diet:    You may advance to your regular diet as tolerated. Medication:    1. You will be given a prescription for pain medications when you are discharged from the hospital.  Take the medication as needed according to the directions on the prescription bottle. Possible side effects of the medication include dizziness, headache, nausea, vomiting, constipation and urinary retention. If you experience any of these side effects call the office so that we can assist you in relieving them. Discontinue the use of the pain medication if you develop itching, rash, shortness of breath or difficulties swallowing.   If these symptoms become severe or aren't relieved by discontinuing the medication you should seek immediate medical attention. Refills of pain medication are authorized during office hours only (8AM - 5PM Mon thru Fri). 2. If you were prescribed Percocet/oxycodone or Dilaudid/hydromorphone you must have a written prescription. These medications legally cannot be called in to a pharmacy. 3. You may take over the counter Ibuprofen/Advil/Aleve between dosages of your pain medication if needed. Do not take Tylenol in addition to your pain medication as most of the pain medication already contains Tylenol. Exceptions include Dilaudid/hydromorphone, Demerol/meperidine and roxicodone. Do not exceed 3000 mg of Tylenol per day. Ex:  (hydrocodon 5/325mg = 325 mg of Tylenol)   4. If you have had a joint replacement you should take Xarelto 10 mg daily for 28 days from the date of your surgery. This will help to prevent blood clots from forming in your legs. 5. You may resume the medication you were taking prior to your surgery. Pain medication may change the effects of any antidepressant medication. If you have any questions about possible interactions between your regular medications and the pain medication you should consult the physician who prescribes your regular medications. Follow Up Appointment:    If you are unsure of your follow-up appointment date and time, please call [phone]. Please let our  know you are scheduling a post-op appointment. Most appointments should be between 7-14 days after your surgery. Physical Therapy:    []   Physical therapy will be discussed with you at your first follow-up appointment with Dr. Mark Torres. You don't need to begin physical therapy prior to that visit. You are to participate with [address] [place] [address] as arranged pre-operatively in the convience of your own home. [x]   Physical therapy will be discussed with you at your first follow-up appointment with Dr. Mark Torres.   You don't need to begin physical therapy prior to that visit. []   If you already have a therapy appointment, please be sure to attend your sessions as scheduled. If you do not have physical therapy scheduled, please call Dr. Laly Giron' office to set up your first appointment as soon as possible.    []   You do not require Physical Therapy. Important signs and symptoms:    If any of the following signs and symptoms occurs, you should contact Dr. Laly Giron' office. Please be advised if a problem arises which you feel required immediate medical attention or your are unable to contact Dr. Laly Giron' office you should seek immediate medical attention. Signs and symptoms to watch for include:  1. A sudden increase in swelling and/or redness or warmth at the area your surgery was performed which isn't relieved by rest, ice and elevation. 2. Oral temperature greater than 101.5 degrees for 12 hours or more which isn't relieved by an increase in fluid intake and taking two Tylenol every 4-6 hours. Do not exceed 3000 mg of Tylenol per day. 3. Excessive drainage from your incisions or drainage that hasn't stopped by 72 hours. 4. Calf pian, tenderness, redness or swelling which isn't relieved with rest and elevation. 5. Fever, chills, shortness of breath, chest pain, nausea, vomiting or other signs and symptoms which are of concern to you. DISCHARGE SUMMARY from Nurse    PATIENT INSTRUCTIONS:    After general anesthesia or intravenous sedation, for 24 hours or while taking prescription Narcotics:  · Limit your activities  · Do not drive and operate hazardous machinery  · Do not make important personal or business decisions  · Do  not drink alcoholic beverages  · If you have not urinated within 8 hours after discharge, please contact your surgeon on call.     Report the following to your surgeon:  · Excessive pain, swelling, redness or odor of or around the surgical area  · Temperature over 100.5  · Nausea and vomiting lasting longer than 4 hours or if unable to take medications  · Any signs of decreased circulation or nerve impairment to extremity: change in color, persistent  numbness, tingling, coldness or increase pain  · Any questions    What to do at Home:  Recommended activity: Activity as tolerated and no driving for today. If you experience any of the following symptoms as listed above, please follow up with Dr. Leonardo Holley. *  Please give a list of your current medications to your Primary Care Provider. *  Please update this list whenever your medications are discontinued, doses are      changed, or new medications (including over-the-counter products) are added. *  Please carry medication information at all times in case of emergency situations. These are general instructions for a healthy lifestyle:    No smoking/ No tobacco products/ Avoid exposure to second hand smoke  Surgeon General's Warning:  Quitting smoking now greatly reduces serious risk to your health. Obesity, smoking, and sedentary lifestyle greatly increases your risk for illness    A healthy diet, regular physical exercise & weight monitoring are important for maintaining a healthy lifestyle    You may be retaining fluid if you have a history of heart failure or if you experience any of the following symptoms:  Weight gain of 3 pounds or more overnight or 5 pounds in a week, increased swelling in our hands or feet or shortness of breath while lying flat in bed. Please call your doctor as soon as you notice any of these symptoms; do not wait until your next office visit. Recognize signs and symptoms of STROKE:    F-face looks uneven    A-arms unable to move or move unevenly    S-speech slurred or non-existent    T-time-call 911 as soon as signs and symptoms begin-DO NOT go       Back to bed or wait to see if you get better-TIME IS BRAIN. Warning Signs of HEART ATTACK     Call 911 if you have these symptoms:   Chest discomfort.  Most heart attacks involve discomfort in the center of the chest that lasts more than a few minutes, or that goes away and comes back. It can feel like uncomfortable pressure, squeezing, fullness, or pain.  Discomfort in other areas of the upper body. Symptoms can include pain or discomfort in one or both arms, the back, neck, jaw, or stomach.  Shortness of breath with or without chest discomfort.  Other signs may include breaking out in a cold sweat, nausea, or lightheadedness. Don't wait more than five minutes to call 911 - MINUTES MATTER! Fast action can save your life. Calling 911 is almost always the fastest way to get lifesaving treatment. Emergency Medical Services staff can begin treatment when they arrive -- up to an hour sooner than if someone gets to the hospital by car. The discharge information has been reviewed with the patient and caregiver. The patient and caregiver verbalized understanding. Discharge medications reviewed with the patient and caregiver and appropriate educational materials and side effects teaching were provided. Patient armband removed and shredded.     ___________________________________________________________________________________________________________________________________

## 2018-10-11 NOTE — PERIOP NOTES
AVS medication list reviewed and no duplicates noted. Discharge information reviewed with patient and spouse; verbalized understanding of follow up. Patient transferred from bed to wheelchair without difficulty for discharge. Patient states she uses wheelchair at home for movement, and states that she feels safe going home.

## 2018-10-11 NOTE — IP AVS SNAPSHOT
[address] [place] [address] [place] [address] 
[phone] Patient: Louisa Brown MRN: [id number] OYQ:[id number] About your hospitalization You were admitted on:  October 11, 2018 You last received care in the:  [facility] PACU You were discharged on:  October 11, 2018 Why you were hospitalized Your primary diagnosis was:  Rupture Of Right Patellar Tendon Your diagnoses also included:  Bartholin's Gland Abscess, Hypertension, Kidney Disease, Obesity, Renal Failure, Rheumatoid Arthritis (Hcc), Patellar Tendon Rupture, Right, Subsequent Encounter Follow-up Information Follow up With Details Comments Contact Info Ole Wood MD   [address] [facility] [address] [place] [address] 
[phone] Megan Villafuerte MD  Office will call you tomorrow to schedule follow up appointment. [address] [place] [address] 
[phone] Discharge Orders None A check magdaleno indicates which time of day the medication should be taken. My Medications START taking these medications Instructions Each Dose to Equal  
 Morning Noon Evening Bedtime  
 cephALEXin 500 mg capsule Commonly known as:  Rosa Oates Your last dose was: Your next dose is: Take 1 Cap by mouth four (4) times daily for 1 day. 500 mg  
    
   
   
   
  
 oxyCODONE IR 5 mg immediate release tablet Commonly known as:  Brennon Lennon Your last dose was: Your next dose is: Take 1-2 Tabs by mouth every four (4) hours as needed for Pain. Max Daily Amount: 60 mg.  
 5-10 mg CONTINUE taking these medications Instructions Each Dose to Equal  
 Morning Noon Evening Bedtime  
 amLODIPine 10 mg tablet Commonly known as:  Yesi Proctor Your last dose was: Your next dose is: Take 10 mg by mouth daily.   
 10 mg  
    
   
   
   
  
 calcitRIOL 0.5 mcg capsule Commonly known as:  ROCALTROL Your last dose was: Your next dose is: Take 0.5 mcg by mouth daily. 0.5 mcg  
    
   
   
   
  
 carvedilol 25 mg tablet Commonly known as:  Haseeb Nuñezg Your last dose was: Your next dose is: Take 25 mg by mouth two (2) times daily (with meals). 25 mg  
    
   
   
   
  
 cloNIDine HCl 0.3 mg tablet Commonly known as:  CATAPRES Your last dose was: Your next dose is: Take 0.3 mg by mouth two (2) times a day. Indications: hypertension 0.3 mg  
    
   
   
   
  
 furosemide 40 mg tablet Commonly known as:  LASIX Your last dose was: Your next dose is: Take 40 mg by mouth daily. Indications: EDEMA 40 mg  
    
   
   
   
  
 minoxidil 10 mg tablet Commonly known as:  [place] Fariha Your last dose was: Your next dose is: Take 5 mg by mouth two (2) times a day. Indications: hypertension 5 mg  
    
   
   
   
  
 mycophenolate 500 mg tablet Commonly known as:  CELLCEPT Your last dose was: Your next dose is: Take 500 mg by mouth two (2) times a day. Indications: Lupus 500 mg  
    
   
   
   
  
 omeprazole 40 mg capsule Commonly known as:  PRILOSEC Your last dose was: Your next dose is: Take 40 mg by mouth daily. Pt instructed to take am of surgery. Indications: GASTROESOPHAGEAL REFLUX  
 40 mg  
    
   
   
   
  
 ramipril 5 mg capsule Commonly known as:  ALTACE Your last dose was: Your next dose is: Take 5 mg by mouth daily. Indications: hypertension 5 mg RENVELA 800 mg Tab tab Generic drug:  sevelamer carbonate Your last dose was: Your next dose is: Take 800 mg by mouth three (3) times daily (with meals).   
 800 mg  
    
   
   
   
  
  
  
 Where to Get Your Medications Information on where to get these meds will be given to you by the nurse or doctor. ! Ask your nurse or doctor about these medications  
  cephALEXin 500 mg capsule  
 oxyCODONE IR 5 mg immediate release tablet Opioid Education Prescription Opioids: What You Need to Know: 
 
 
[]   Keep the surgical dressing on for two days from the day of your surgery. Once you remove this, no dressing is necessary if there is no drainage. [x]   You may change your dressing as needed. Beginning the 2 days after you are discharged from the hospital you can change your dressing daily. A big, bulky dressing isn't necessary as long as there isn't any drainage from the incisions. You can put a band-aid or a pice of gauze over each incision and wear an ACE bandage as needed for comfort and swelling. []   Don't remove your dressing or get them wet. · It isn't necessary to apply antibiotic ointment to your incisions. Sutures will be removed at your one week post-op visit. Staples (if you have them) are removed in two weeks. If you have steri-strips over your incision they will start to peel off in 7-10 days as you get them wet. They don't need to be removed prior to that. When they begin to peel off you can remove them. They should all be removed by 14 days from your surgery. If your wound is closed with Dermabond nothing has to be done or removed. Showering/Bathing: 
 
[x]   You may shower 2 days after surgery. Your dressing may be removed for showering. You may get your incisions wet in the shower. Don't vigorously scrub the area where your incisions are. Apply a clean, dry dressing after drying off the area of your incisions. Don't take a tub bath, get in a swimming pool or Jacuzzi until the incisions are completely healed. Do not soak your incisions under water. []   Do not get the dressing wet. Once you remove it two days from surgery, you may get the incision wet if there is no drainage. Weight Bearing Status/Braces/Activity: 
 
[]   You may walk as tolerated and perform your normal daily activities. Use crutches, a walker, or a cane only if you need them. You should strive to achieve full range of motion in your knee as soon as possible. Please start using a stationary bike or walking for exercise 3 or 4 days after surgery. We would like for you to return to your normal activities as soon as possible. If you feel comfortable returning to work, you may do so at any time. [x]   Weight bearing as tolerated with the knee immobilizer in place. Use crutches, cane, or walker as needed. You should sleep in your knee immobilizer. []   Non-weight bearing. Please do not bear any weight on your leg. You may use your toes for balance when walking with a cane or crutches. Ice/Elevation: 
 
Continue ice and elevation consistently for 48 hours after surgery. When elevating your knee elevate it on about 4 pillows to be sure it is above your heart. After 48 hours, you should ice your knee 3 times per day, for 20 minutes at a time for the next 5 days.   After one week from surgery, you may use ice and elevation as needed for pain and swelling. Diet: 
 
You may advance to your regular diet as tolerated. Medication: 
 
1. You will be given a prescription for pain medications when you are discharged from the hospital.  Take the medication as needed according to the directions on the prescription bottle. Possible side effects of the medication include dizziness, headache, nausea, vomiting, constipation and urinary retention. If you experience any of these side effects call the office so that we can assist you in relieving them. Discontinue the use of the pain medication if you develop itching, rash, shortness of breath or difficulties swallowing. If these symptoms become severe or aren't relieved by discontinuing the medication you should seek immediate medical attention. Refills of pain medication are authorized during office hours only (8AM - 5PM Mon thru Fri). 2. If you were prescribed Percocet/oxycodone or Dilaudid/hydromorphone you must have a written prescription. These medications legally cannot be called in to a pharmacy. 3. You may take over the counter Ibuprofen/Advil/Aleve between dosages of your pain medication if needed. Do not take Tylenol in addition to your pain medication as most of the pain medication already contains Tylenol. Exceptions include Dilaudid/hydromorphone, Demerol/meperidine and roxicodone. Do not exceed 3000 mg of Tylenol per day. Ex:  (hydrocodon 5/325mg = 325 mg of Tylenol) 4. If you have had a joint replacement you should take Xarelto 10 mg daily for 28 days from the date of your surgery. This will help to prevent blood clots from forming in your legs. 5. You may resume the medication you were taking prior to your surgery. Pain medication may change the effects of any antidepressant medication.   If you have any questions about possible interactions between your regular medications and the pain medication you should consult the physician who prescribes your regular medications. Follow Up Appointment: If you are unsure of your follow-up appointment date and time, please call [phone]. Please let our  know you are scheduling a post-op appointment. Most appointments should be between 7-14 days after your surgery. Physical Therapy: 
 
[]   Physical therapy will be discussed with you at your first follow-up appointment with Dr. Michelle Anaya. You don't need to begin physical therapy prior to that visit. You are to participate with [address] [place] [address] as arranged pre-operatively in the convience of your own home. [x]   Physical therapy will be discussed with you at your first follow-up appointment with Dr. Michelle Anaya. You don't need to begin physical therapy prior to that visit. []   If you already have a therapy appointment, please be sure to attend your sessions as scheduled. If you do not have physical therapy scheduled, please call Dr. Michelle Anaya' office to set up your first appointment as soon as possible. 
 
[]   You do not require Physical Therapy. Important signs and symptoms: 
 
If any of the following signs and symptoms occurs, you should contact Dr. Michelle Anaya' office. Please be advised if a problem arises which you feel required immediate medical attention or your are unable to contact Dr. Michelle Anaya' office you should seek immediate medical attention. Signs and symptoms to watch for include: 1. A sudden increase in swelling and/or redness or warmth at the area your surgery was performed which isn't relieved by rest, ice and elevation. 2. Oral temperature greater than 101.5 degrees for 12 hours or more which isn't relieved by an increase in fluid intake and taking two Tylenol every 4-6 hours. Do not exceed 3000 mg of Tylenol per day. 3. Excessive drainage from your incisions or drainage that hasn't stopped by 72 hours. 4. Calf pian, tenderness, redness or swelling which isn't relieved with rest and elevation. 5. Fever, chills, shortness of breath, chest pain, nausea, vomiting or other signs and symptoms which are of concern to you. DISCHARGE SUMMARY from Nurse PATIENT INSTRUCTIONS: 
 
 
F-face looks uneven A-arms unable to move or move unevenly S-speech slurred or non-existent T-time-call 911 as soon as signs and symptoms begin-DO NOT go Back to bed or wait to see if you get better-TIME IS BRAIN. Warning Signs of HEART ATTACK Call 911 if you have these symptoms: 
? Chest discomfort. Most heart attacks involve discomfort in the center of the chest that lasts more than a few minutes, or that goes away and comes back. It can feel like uncomfortable pressure, squeezing, fullness, or pain. ? Discomfort in other areas of the upper body. Symptoms can include pain or discomfort in one or both arms, the back, neck, jaw, or stomach. ? Shortness of breath with or without chest discomfort. ? Other signs may include breaking out in a cold sweat, nausea, or lightheadedness. Don't wait more than five minutes to call [address]! Fast action can save your life. Calling 911 is almost always the fastest way to get lifesaving treatment. Emergency Medical Services staff can begin treatment when they arrive  up to an hour sooner than if someone gets to the hospital by car. The discharge information has been reviewed with the patient and caregiver. The patient and caregiver verbalized understanding. Discharge medications reviewed with the patient and caregiver and appropriate educational materials and side effects teaching were provided. Patient armband removed and shredded. ___________________________________________________________________________________________________________________________________ Introducing [facility]! Viola Chaney introduces [employer] patient portal. Now you can access parts of your medical record, email your doctor's office, and request medication refills online. 1. In your internet browser, go to [email or web address] 2. Click on the First Time User? Click Here link in the Sign In box. You will see the New Member Sign Up page. 3. Enter your BigCalc Access Code exactly as it appears below. You will not need to use this code after youve completed the sign-up process. If you do not sign up before the expiration date, you must request a new code. · BigCalc Access Code: [email or web address] Expires: 12/15/2018  6:42 AM 
 
4. Enter the last four digits of your Social Security Number (xxxx) and Date of Birth (mm/dd/yyyy) as indicated and click Submit. You will be taken to the next sign-up page. 5. Create a [email or web address] ID. This will be your BigCalc login ID and cannot be changed, so think of one that is secure and easy to remember. 6. Create a [employer]c password. You can change your password at any time. 7. Enter your Password Reset Question and Answer. This can be used at a later time if you forget your password. 8. Enter your e-mail address. You will receive e-mail notification when new information is available in [address] 9. Click Sign Up. You can now view and download portions of your medical record. 10. Click the Download Summary menu link to download a portable copy of your medical information. If you have questions, please visit the Frequently Asked Questions section of the BigCalc website. Remember, BigCalc is NOT to be used for urgent needs. For medical emergencies, dial 911. Now available from your iPhone and Android! Introducing Rajesh Sin As a [employer] patient, I wanted to make you aware of our electronic visit tool called Rajesh Sin. [employer] 24/7 allows you to connect within minutes with a medical provider 24 hours a day, seven days a week via a mobile device or tablet or logging into a secure website from your computer. You can access Rajesh Duranfin from anywhere in the [place]. A virtual visit might be right for you when you have a simple condition and feel like you just dont want to get out of bed, or cant get away from work for an appointment, when your regular [employer] provider is not available (evenings, weekends or holidays), or when youre out of town and need minor care. Electronic visits cost only $49 and if the [employer] 24/7 provider determines a prescription is needed to treat your condition, one can be electronically transmitted to a nearby pharmacy*. Please take a moment to enroll today if you have not already done so. The enrollment process is free and takes just a few minutes. To enroll, please download the [employer] 24/7 yanick to your tablet or phone, or visit [email or web address] to enroll on your computer. And, as an [address] [place] [address] patient with a [employer] account, the results of your visits will be scanned into your electronic medical record and your primary care provider will be able to view the scanned results. We urge you to continue to see your regular [employer] provider for your ongoing medical care. And while your primary care provider may not be the one available when you seek a Rajesh Arshadgalileafin virtual visit, the peace of mind you get from getting a real diagnosis real time can be priceless. For more information on Rajesh Arshadgalileafin, view our Frequently Asked Questions (FAQs) at [email or web address]. Sincerely, 
 
Brigido Briseno MD 
Chief Medical Officer Tameka Lubin *:  certain medications cannot be prescribed via Rajesh Sin Providers Seen During Your Hospitalization Provider Specialty Primary office phone Justin Quintero, [address] [facility] [phone] Your Primary Care Physician (PCP) Primary Care Physician Office Phone Alphonsomane [place]e Back [phone] You are allergic to the following No active allergies Recent Documentation Height Weight BMI OB Status Smoking Status 1.626 m 97.5 kg 36.9 kg/m2 Having regular periods Never Smoker Emergency Contacts Name Discharge Info Relation Home Work Mobile [address] CAREGIVER [3] Spouse [3]   [phone] [address] CAREGIVER [3] Father [15]   [phone] Patient Belongings The following personal items are in your possession at time of discharge: 
  Dental Appliances: None         Home Medications: None   Jewelry: None  Clothing: Footwear, Socks, Undergarments, Dress (with Phuong Guardian)    Other Valuables: None Please provide this summary of care documentation to your next provider. Signatures-by signing, you are acknowledging that this After Visit Summary has been reviewed with you and you have received a copy. Patient Signature:  ____________________________________________________________ Date:  ____________________________________________________________  
  
Reyes Eller Provider Signature:  ____________________________________________________________ Date:  ____________________________________________________________

## 2018-10-11 NOTE — NURSE NAVIGATOR
Telephone call  [facility] due to EKG changes. Faxed two EKG's to office dated 7/30/18 and 9/27/18. Return call from Dr. Vu Bolivar. Spoke with Dr. Karin Pandey regarding EKG changes.   Note to follow from Dr. Karin Pandey.

## 2018-10-11 NOTE — ANESTHESIA PREPROCEDURE EVALUATION
Anesthetic History No history of anesthetic complications Review of Systems / Medical History Patient summary reviewed, nursing notes reviewed and pertinent labs reviewed Pulmonary Within defined limits Neuro/Psych Within defined limits Cardiovascular Hypertension Exercise tolerance: >4 METS 
  
GI/Hepatic/Renal 
  
GERD Renal disease: ESRD Endo/Other Morbid obesity and arthritis Other Findings Physical Exam 
 
Airway Mallampati: III 
TM Distance: < 4 cm Neck ROM: decreased range of motion Mouth opening: Diminished (comment) Cardiovascular Regular rate and rhythm,  S1 and S2 normal,  no murmur, click, rub, or gallop Rhythm: regular Rate: normal 
 
 
 
 Dental 
No notable dental hx Pulmonary Breath sounds clear to auscultation Abdominal 
GI exam deferred Other Findings Anesthetic Plan ASA: 4 Anesthesia type: general 
 
 
 
 
Induction: Intravenous Anesthetic plan and risks discussed with: Patient Discussed case with  to confirm patient is cleared for surgery.

## 2018-10-12 NOTE — OP NOTES
[facility] [place]  OPERATIVE REPORT    Kaleb Chris  MR#: [id number]  : 1980  ACCOUNT #: [de-identified]   DATE OF SERVICE: 10/11/2018    PREOPERATIVE DIAGNOSIS:  Right infrapatellar tendon tear. POSTOPERATIVE DIAGNOSIS:  Right infrapatellar tendon tear. PROCEDURE PERFORMED:  Right infrapatellar tendon repair (2 Arthrex 4.75 Bio-SwiveLocks with 1 Krackow Weave FiberTape). SURGEON:  Gricelda Kendall III, MD    ASSISTANT:  Jadiel Chacon PA-C    ANESTHESIA:  General.    ESTIMATED BLOOD LOSS:  20cc    SPECIMENS REMOVED:  none    COMPLICATIONS:  None. FINDINGS:  Same. IMPLANTS:  As above. TOURNIQUET TIME:  25 minutes at 350 mmHg. INDICATIONS: The patient is a 28-year-old -American female with a known right patellar tendon rupture from 4 weeks ago. The patient has been posted for the operation 3 weeks in a row but has had abnormal labs which have taken this long to get situated. She now presents for infrapatellar tendon repair. DESCRIPTION OF PROCEDURE:  The patient was brought to the operating theater. After adequate anesthesia, the right knee was prepped and draped in the typical sterile fashion. The limb was exsanguinated with an Esmarch bandage and the tourniquet inflated to 350 mmHg. An anterior midline incision was made from the mid pole of the patella to just above the tibial tubercle. The incision was deepened down to the hematoma, which was evacuated and then the patellar tendon was isolated for its proximal half and the leading edge of the distal pole of the patella was cleaned and a rongeur was used to get down to a good bleeding bony surface. An internal brace kit from Arthrex was placed on the table. It had 2 Bio-SwiveLocks in it and a FiberTape.   I used the tape on a free needle and in a Krackow stitch fashion I went down the medial side of the tendon for 5 stitches crossed over and came back up the other side of the tendon for 5 stitches with 2 leads of the FiberTape coming out of the tendon. The 2 guide pins were then drilled in the infrapatella and these were tapped and 2 of the 4.5 corkscrews were then used to seat the tape fully up next to the patellar bone. I used a towel clip to hold this in position while these were seated fully. There was good purchase at this time and good stability of the repair to about 45 degrees. At 45 degrees, she had no separation at the repair, but certainly had tension on the repair. I kept these two #2 FiberWires in each of the anchors and used them in an overlapping fashion to take the soft tissue on the top of the patella and suture it to the patellar tendon distally. I used a #1 Vicryl to repair the retinaculum medially and laterally, which was significantly disrupted laterally, but less disrupted medially. Once this was fully repaired. She could get to that same 45 degree magdaleno with gravity with no separation of the repair just tightness of the quadriceps muscle. The wound was then irrigated out. The skin was closed with inverted 2-0 Vicryls. Skin was closed with inverted 4-0 Monocryl and then the Dermabond Prineo skin system was applied. Approximately 30 mL of 0.25% Marcaine with epinephrine was injected in the skin and around the tendon repair area. This was then dressed with a Mepilex, 4 x 4's and an Ace wrap from the toes to midthigh. The patient was then placed into a knee range of motion brace locked in extension until followup. The patient had the tourniquet deflated. She was awoken from general anesthesia and returned to the recovery room awake and stable condition. All instrument, sponge and needle counts were correct. MD Kamaljit Mann / Dex Campos  D: 10/11/2018 14:02     T: 10/12/2018 07:27  JOB #: [id number]

## 2018-11-25 NOTE — NURSE NAVIGATOR
Discussion/Summary  Called parent back regarding e.i. I let them know i would call E.I and follow up with them as well as refax the order. I let him know that if he is not contacted by next week i will let the health steps specialist know to call E.I. Parent voiced understanding. -GD      Signatures   Electronically signed by : Alex Saenz CMA; Feb 17 2017 11:16AM CST     Potassium 4.7 per Dior Locke in lab.

## 2019-03-25 NOTE — ANESTHESIA POSTPROCEDURE EVALUATION
. 
Post-Anesthesia Evaluation & Assessment Visit Vitals  /60  Pulse 88  Temp 36.7 °C (98 °F)  Resp 14  
 Ht 5' 4\" (1.626 m)  Wt 97.5 kg (214 lb 15.2 oz)  SpO2 100%  BMI 36.9 kg/m2 Nausea/Vomiting: no nausea Post-operative hydration adequate. Pain score (VAS): 3 Mental status & Level of consciousness: alert and oriented x 3 Neurological status: moves all extremities, sensation grossly intact Pulmonary status: airway patent, no supplemental oxygen required Complications related to anesthesia: none Additional comments:
No

## 2019-05-21 ENCOUNTER — HOSPITAL ENCOUNTER (OUTPATIENT)
Age: 39
Setting detail: OBSERVATION
Discharge: NURSING, REHAB OR LONG-TERM CARE | End: 2019-05-22
Attending: EMERGENCY MEDICINE | Admitting: HOSPITALIST
Payer: MEDICARE

## 2019-05-21 DIAGNOSIS — N18.9 ANEMIA DUE TO CHRONIC KIDNEY DISEASE, UNSPECIFIED CKD STAGE: Primary | ICD-10-CM

## 2019-05-21 DIAGNOSIS — Z99.2 ESRD (END STAGE RENAL DISEASE) ON DIALYSIS (HCC): ICD-10-CM

## 2019-05-21 DIAGNOSIS — N18.6 ESRD (END STAGE RENAL DISEASE) ON DIALYSIS (HCC): ICD-10-CM

## 2019-05-21 DIAGNOSIS — D63.1 ANEMIA DUE TO CHRONIC KIDNEY DISEASE, UNSPECIFIED CKD STAGE: Primary | ICD-10-CM

## 2019-05-21 PROBLEM — D64.9 ANEMIA: Status: ACTIVE | Noted: 2019-05-21

## 2019-05-21 LAB
ALBUMIN SERPL-MCNC: 1.7 G/DL (ref 3.4–5)
ALBUMIN/GLOB SERPL: 0.4 {RATIO} (ref 0.8–1.7)
ALP SERPL-CCNC: 101 U/L (ref 45–117)
ALT SERPL-CCNC: 9 U/L (ref 13–56)
ANION GAP SERPL CALC-SCNC: 8 MMOL/L (ref 3–18)
AST SERPL-CCNC: 17 U/L (ref 15–37)
BASOPHILS # BLD: 0 K/UL (ref 0–0.1)
BASOPHILS NFR BLD: 0 % (ref 0–2)
BILIRUB SERPL-MCNC: 0.2 MG/DL (ref 0.2–1)
BUN SERPL-MCNC: 28 MG/DL (ref 7–18)
BUN/CREAT SERPL: 9 (ref 12–20)
CALCIUM SERPL-MCNC: 7.8 MG/DL (ref 8.5–10.1)
CHLORIDE SERPL-SCNC: 102 MMOL/L (ref 100–108)
CO2 SERPL-SCNC: 29 MMOL/L (ref 21–32)
CREAT SERPL-MCNC: 2.95 MG/DL (ref 0.6–1.3)
DIFFERENTIAL METHOD BLD: ABNORMAL
EOSINOPHIL # BLD: 0.2 K/UL (ref 0–0.4)
EOSINOPHIL NFR BLD: 3 % (ref 0–5)
ERYTHROCYTE [DISTWIDTH] IN BLOOD BY AUTOMATED COUNT: 19.1 % (ref 11.6–14.5)
GLOBULIN SER CALC-MCNC: 4.1 G/DL (ref 2–4)
GLUCOSE SERPL-MCNC: 92 MG/DL (ref 74–99)
HCT VFR BLD AUTO: 21.8 % (ref 35–45)
HGB BLD-MCNC: 6.9 G/DL (ref 12–16)
LYMPHOCYTES # BLD: 1.6 K/UL (ref 0.9–3.6)
LYMPHOCYTES NFR BLD: 23 % (ref 21–52)
MCH RBC QN AUTO: 29.5 PG (ref 24–34)
MCHC RBC AUTO-ENTMCNC: 31.7 G/DL (ref 31–37)
MCV RBC AUTO: 93.2 FL (ref 74–97)
MONOCYTES # BLD: 0.6 K/UL (ref 0.05–1.2)
MONOCYTES NFR BLD: 9 % (ref 3–10)
NEUTS SEG # BLD: 4.7 K/UL (ref 1.8–8)
NEUTS SEG NFR BLD: 65 % (ref 40–73)
PLATELET # BLD AUTO: 356 K/UL (ref 135–420)
PMV BLD AUTO: 8.2 FL (ref 9.2–11.8)
POTASSIUM SERPL-SCNC: 3 MMOL/L (ref 3.5–5.5)
PROT SERPL-MCNC: 5.8 G/DL (ref 6.4–8.2)
RBC # BLD AUTO: 2.34 M/UL (ref 4.2–5.3)
SODIUM SERPL-SCNC: 139 MMOL/L (ref 136–145)
WBC # BLD AUTO: 7.1 K/UL (ref 4.6–13.2)

## 2019-05-21 PROCEDURE — 86900 BLOOD TYPING SEROLOGIC ABO: CPT

## 2019-05-21 PROCEDURE — 86923 COMPATIBILITY TEST ELECTRIC: CPT

## 2019-05-21 PROCEDURE — 99218 HC RM OBSERVATION: CPT

## 2019-05-21 PROCEDURE — 85025 COMPLETE CBC W/AUTO DIFF WBC: CPT

## 2019-05-21 PROCEDURE — 74011250636 HC RX REV CODE- 250/636: Performed by: EMERGENCY MEDICINE

## 2019-05-21 PROCEDURE — 36430 TRANSFUSION BLD/BLD COMPNT: CPT

## 2019-05-21 PROCEDURE — 74011250637 HC RX REV CODE- 250/637: Performed by: HOSPITALIST

## 2019-05-21 PROCEDURE — P9016 RBC LEUKOCYTES REDUCED: HCPCS

## 2019-05-21 PROCEDURE — 77030013169 SET IV BLD ICUM -A

## 2019-05-21 PROCEDURE — 86920 COMPATIBILITY TEST SPIN: CPT

## 2019-05-21 PROCEDURE — 80053 COMPREHEN METABOLIC PANEL: CPT

## 2019-05-21 PROCEDURE — 99285 EMERGENCY DEPT VISIT HI MDM: CPT

## 2019-05-21 RX ORDER — OMEPRAZOLE 20 MG/1
40 CAPSULE, DELAYED RELEASE ORAL DAILY
Status: DISCONTINUED | OUTPATIENT
Start: 2019-05-22 | End: 2019-05-22 | Stop reason: HOSPADM

## 2019-05-21 RX ORDER — MYCOPHENOLATE MOFETIL 250 MG/1
500 CAPSULE ORAL 2 TIMES DAILY
Status: DISCONTINUED | OUTPATIENT
Start: 2019-05-22 | End: 2019-05-22 | Stop reason: HOSPADM

## 2019-05-21 RX ORDER — FUROSEMIDE 40 MG/1
40 TABLET ORAL DAILY
Status: DISCONTINUED | OUTPATIENT
Start: 2019-05-22 | End: 2019-05-22 | Stop reason: HOSPADM

## 2019-05-21 RX ORDER — CALCITRIOL 0.25 UG/1
0.5 CAPSULE ORAL DAILY
Status: DISCONTINUED | OUTPATIENT
Start: 2019-05-22 | End: 2019-05-22 | Stop reason: HOSPADM

## 2019-05-21 RX ORDER — CARVEDILOL 12.5 MG/1
25 TABLET ORAL 2 TIMES DAILY WITH MEALS
Status: DISCONTINUED | OUTPATIENT
Start: 2019-05-22 | End: 2019-05-22 | Stop reason: HOSPADM

## 2019-05-21 RX ORDER — SEVELAMER CARBONATE 800 MG/1
800 TABLET, FILM COATED ORAL
Status: DISCONTINUED | OUTPATIENT
Start: 2019-05-22 | End: 2019-05-22 | Stop reason: HOSPADM

## 2019-05-21 RX ORDER — SODIUM CHLORIDE 9 MG/ML
250 INJECTION, SOLUTION INTRAVENOUS AS NEEDED
Status: DISCONTINUED | OUTPATIENT
Start: 2019-05-21 | End: 2019-05-22 | Stop reason: HOSPADM

## 2019-05-21 RX ORDER — LISINOPRIL 10 MG/1
10 TABLET ORAL DAILY
Status: DISCONTINUED | OUTPATIENT
Start: 2019-05-22 | End: 2019-05-21

## 2019-05-21 RX ORDER — AMLODIPINE BESYLATE 5 MG/1
10 TABLET ORAL DAILY
Status: DISCONTINUED | OUTPATIENT
Start: 2019-05-22 | End: 2019-05-22 | Stop reason: HOSPADM

## 2019-05-21 RX ORDER — LISINOPRIL 5 MG/1
10 TABLET ORAL DAILY
Status: DISCONTINUED | OUTPATIENT
Start: 2019-05-22 | End: 2019-05-22 | Stop reason: HOSPADM

## 2019-05-21 RX ORDER — CLONIDINE HYDROCHLORIDE 0.1 MG/1
0.3 TABLET ORAL 2 TIMES DAILY
Status: DISCONTINUED | OUTPATIENT
Start: 2019-05-21 | End: 2019-05-22 | Stop reason: HOSPADM

## 2019-05-21 RX ORDER — MINOXIDIL 2.5 MG/1
5 TABLET ORAL 2 TIMES DAILY
Status: DISCONTINUED | OUTPATIENT
Start: 2019-05-21 | End: 2019-05-22 | Stop reason: HOSPADM

## 2019-05-21 RX ORDER — HEPARIN SODIUM 5000 [USP'U]/ML
5000 INJECTION, SOLUTION INTRAVENOUS; SUBCUTANEOUS EVERY 8 HOURS
Status: DISCONTINUED | OUTPATIENT
Start: 2019-05-21 | End: 2019-05-22 | Stop reason: HOSPADM

## 2019-05-21 RX ADMIN — SODIUM CHLORIDE 250 ML: 900 INJECTION, SOLUTION INTRAVENOUS at 17:21

## 2019-05-21 RX ADMIN — CLONIDINE HYDROCHLORIDE 0.3 MG: 0.1 TABLET ORAL at 23:27

## 2019-05-21 NOTE — ED TRIAGE NOTES
Patient arrived via EMS for blood tranfusion post dialysis, labs drawn during dialysis today showed HGB 6.3.  A/ox3 moves all extremities

## 2019-05-21 NOTE — ED NOTES
Patient tolerating PRBC infusion without signs and symptoms of blood transfusion reaction, PRBC transfusion increased to 150mL/hr

## 2019-05-21 NOTE — ED PROVIDER NOTES
EMERGENCY DEPARTMENT HISTORY AND PHYSICAL EXAM    Date: 5/21/2019  Patient Name: Dimple Rivera    History of Presenting Illness     Chief Complaint   Patient presents with    Other         History Provided By: Patient    3:45 PM  Dimple Rivera is a 41 y.o. female with PMHX of ESRD on HD, chronic anemia, calciphylaxis who presents to the emergency department C/O anemia. Per patient she had dialysis today and was instructed by her nephrologist to go to the emergency department for a blood transfusion. She denies any chest pain, shortness of breath, fever, nausea or vomiting, or any other complaints. She does note that she has chronic wounds on both of her legs for which she has been receiving wound care at the rehab facility. PCP: Nanda Thomas MD    Current Facility-Administered Medications   Medication Dose Route Frequency Provider Last Rate Last Dose    0.9% sodium chloride infusion 250 mL  250 mL IntraVENous PRN Chelsey Bonilla MD 15 mL/hr at 05/21/19 1721 250 mL at 05/21/19 1721     Current Outpatient Medications   Medication Sig Dispense Refill    minoxidil (LONITEN) 10 mg tablet Take 5 mg by mouth two (2) times a day. Indications: hypertension      oxyCODONE IR (ROXICODONE) 5 mg immediate release tablet Take 1-2 Tabs by mouth every four (4) hours as needed for Pain. Max Daily Amount: 60 mg. 60 Tab 0    amLODIPine (NORVASC) 10 mg tablet Take 10 mg by mouth daily.  calcitRIOL (ROCALTROL) 0.5 mcg capsule Take 0.5 mcg by mouth daily.  carvedilol (COREG) 25 mg tablet Take 25 mg by mouth two (2) times daily (with meals).  sevelamer carbonate (RENVELA) 800 mg tab tab Take 800 mg by mouth three (3) times daily (with meals).  mycophenolate (CELLCEPT) 500 mg tablet Take 500 mg by mouth two (2) times a day. Indications: Lupus      omeprazole (PRILOSEC) 40 mg capsule Take 40 mg by mouth daily. Pt instructed to take am of surgery.    Indications: GASTROESOPHAGEAL REFLUX      cloNIDine (CATAPRES) 0.3 mg tablet Take 0.3 mg by mouth two (2) times a day. Indications: hypertension      ramipril (ALTACE) 5 mg capsule Take 5 mg by mouth daily. Indications: hypertension      furosemide (LASIX) 40 mg tablet Take 40 mg by mouth daily. Indications: EDEMA         Past History     Past Medical History:  Past Medical History:   Diagnosis Date    Anemia 1996    Arthritis     RA    Autoimmune disease ([address]     Lupus, RA    Bartholin cyst     Chronic kidney disease 2013    ESRD- related to the lupus, periteneal dialysis    GERD (gastroesophageal reflux disease)     Hypertension 2008       Past Surgical History:  Past Surgical History:   Procedure Laterality Date    HX OTHER SURGICAL      periteneal cather placement    HX OTHER SURGICAL      Dialysis port placed and removed    HX OTHER SURGICAL      kidney biopsy    HX TONSILLECTOMY         Family History:  Family History   Problem Relation Age of Onset    Hypertension Mother    [facility] Anemia Mother        Social History:  Social History     Tobacco Use    Smoking status: Never Smoker    Smokeless tobacco: Never Used   Substance Use Topics    Alcohol use: No    Drug use: No       Allergies: Allergies   Allergen Reactions    Haldol [Haloperidol Lactate] Seizures         Review of Systems   Review of Systems   Constitutional: Negative for fever. Respiratory: Negative for shortness of breath. Cardiovascular: Negative for chest pain. Gastrointestinal: Negative for abdominal pain. All other systems reviewed and are negative.         Physical Exam     Vitals:    05/21/19 1540 05/21/19 1724 05/21/19 1733 05/21/19 1743   BP:  124/71 131/76 127/77   Pulse:  (!) 107 (!) 106 (!) 103   Resp:  18 18 18   Temp:  98.7 °F (37.1 °C) 98.5 °F (36.9 °C) 98.5 °F (36.9 °C)   SpO2: 100% 100% 100% 100%   Weight:       Height:         Physical Exam    Nursing notes and vital signs reviewed    Constitutional: Chronically ill appearing, no acute distress  Head: Normocephalic, Atraumatic  Eyes: EOMI  Neck: Supple  Cardiovascular: Tachycardic rate and regular rhythm, no murmurs, rubs, or gallops  Chest: Normal work of breathing and chest excursion bilaterally  Lungs: Clear to ausculation bilaterally  Abdomen: Soft, non tender, non distended, normoactive bowel sounds  Extremities: Dressed wounds bilaterally  Skin: Warm and dry, normal cap refill  Neuro: Alert and appropriate  Psychiatric: Normal mood and affect      Diagnostic Study Results     Labs -     Recent Results (from the past 12 hour(s))   CBC WITH AUTOMATED DIFF    Collection Time: 05/21/19  3:50 PM   Result Value Ref Range    WBC 7.1 4.6 - 13.2 K/uL    RBC 2.34 (L) 4.20 - 5.30 M/uL    HGB 6.9 (L) 12.0 - 16.0 g/dL    HCT 21.8 (L) 35.0 - 45.0 %    MCV 93.2 74.0 - 97.0 FL    MCH 29.5 24.0 - 34.0 PG    MCHC 31.7 31.0 - 37.0 g/dL    RDW 19.1 (H) 11.6 - 14.5 %    PLATELET 212 190 - 669 K/uL    MPV 8.2 (L) 9.2 - 11.8 FL    NEUTROPHILS 65 40 - 73 %    LYMPHOCYTES 23 21 - 52 %    MONOCYTES 9 3 - 10 %    EOSINOPHILS 3 0 - 5 %    BASOPHILS 0 0 - 2 %    ABS. NEUTROPHILS 4.7 1.8 - 8.0 K/UL    ABS. LYMPHOCYTES 1.6 0.9 - 3.6 K/UL    ABS. MONOCYTES 0.6 0.05 - 1.2 K/UL    ABS. EOSINOPHILS 0.2 0.0 - 0.4 K/UL    ABS. BASOPHILS 0.0 0.0 - 0.1 K/UL    DF AUTOMATED     TYPE & SCREEN    Collection Time: 05/21/19  3:50 PM   Result Value Ref Range    Crossmatch Expiration 05/24/2019     ABO/Rh(D) O POSITIVE     Antibody screen NEG     Comment repeat ABO and RH typing is not available     CALLED TO: ELMA CAMPOS RN ED BY CAM ON 5/21/2019 AT 1707.      Unit number [id number]     Blood component type [facility]     Unit division 00     Status of unit ISSUED     Crossmatch result Compatible    METABOLIC PANEL, COMPREHENSIVE    Collection Time: 05/21/19  3:50 PM   Result Value Ref Range    Sodium 139 136 - 145 mmol/L    Potassium 3.0 (L) 3.5 - 5.5 mmol/L    Chloride 102 100 - 108 mmol/L    CO2 29 21 - 32 mmol/L    Anion gap 8 3.0 - 18 mmol/L Glucose 92 74 - 99 mg/dL    BUN 28 (H) 7.0 - 18 MG/DL    Creatinine 2.95 (H) 0.6 - 1.3 MG/DL    BUN/Creatinine ratio 9 (L) 12 - 20      GFR est AA 22 (L) >60 ml/min/1.73m2    GFR est non-AA 18 (L) >60 ml/min/1.73m2    Calcium 7.8 (L) 8.5 - 10.1 MG/DL    Bilirubin, total 0.2 0.2 - 1.0 MG/DL    ALT (SGPT) 9 (L) 13 - 56 U/L    AST (SGOT) 17 15 - 37 U/L    Alk. phosphatase 101 45 - 117 U/L    Protein, total 5.8 (L) 6.4 - 8.2 g/dL    Albumin 1.7 (L) 3.4 - 5.0 g/dL    Globulin 4.1 (H) 2.0 - 4.0 g/dL    A-G Ratio 0.4 (L) 0.8 - 1.7         Radiologic Studies -   No orders to display     CT Results  (Last 48 hours)    None        CXR Results  (Last 48 hours)    None          Medications given in the ED-  Medications   0.9% sodium chloride infusion 250 mL (250 mL IntraVENous New Bag 5/21/19 1721)         Medical Decision Making   I am the first provider for this patient. I reviewed the vital signs, available nursing notes, past medical history, past surgical history, family history and social history. Vital Signs-Reviewed the patient's vital signs. Cardiac Monitor:  Rate: 108 bpm  Rhythm: Sinus tachycardia    Records Reviewed: Nursing Notes and Old Medical Records    Provider Notes (Medical Decision Making): Karoline Salvador is a 41 y.o. female sent by nephrologist for transfusion due to severe anemia. Blood transfusion ordered and discussed with hospitalist for further observation inpatient. Patient understands and agrees with this plan. Procedures:  Procedures    ED Course:   CONSULT NOTE:   5:10 PM  Dr. Rosario Gray spoke with Dr. Timbo Hines   Specialty: Hospitalist  Discussed pt's hx, disposition, and available diagnostic and imaging results over the telephone. Reviewed care plans.  Accepts to medical.     5:14 PM  Consented patient for blood transfusion      Diagnosis and Disposition     Critical Care Time: 5:20 PM  I have spent 30 minutes of critical care time involved in lab review, consultations with specialist, family decision-making, and documentation. During this entire length of time I was immediately available to the patient. Critical Care: The reason for providing this level of medical care for this critically ill patient was due a critical illness that impaired one or more vital organ systems such that there was a high probability of imminent or life threatening deterioration in the patients condition. This care involved high complexity decision making to assess, manipulate, and support vital system functions, to treat this degreee vital organ system failure and to prevent further life threatening deterioration of the patients condition. Core Measures:  For Hospitalized Patients:    1. Hospitalization Decision Time:  The decision to hospitalize the patient was made by Dr. Jeffery Laws at 4:45 PM on 5/21/2019    2. Aspirin: Aspirin was not given because the patient did not present with a stroke at the time of their Emergency Department evaluation    5:20 PM  Patient is being admitted to the hospital by Dr. Melissa Canales. The results of their tests and reasons for their admission have been discussed with them and/or available family. They convey agreement and understanding for the need to be admitted and for their admission diagnosis. CONDITIONS ON ADMISSION:  Sepsis is not present at the time of admission. Deep Vein Thrombosis is not present at the time of admission. Thrombosis is not present at the time of admission. Urinary Tract Infection is not present at the time of admission. Pneumonia is not present at the time of admission. MRSA maybe present at the time of admission. Wound infection maybe present at the time of admission. Pressure Ulcer maybe present at the time of admission. CLINICAL IMPRESSION:    1. Anemia due to chronic kidney disease, unspecified CKD stage    2.  ESRD (end stage renal disease) on dialysis ([facility]      _______________________________      Please note that this dictation was completed with Dragon, the computer voice recognition software. Quite often unanticipated grammatical, syntax, homophones, and other interpretive errors are inadvertently transcribed by the computer software. Please disregard these errors. Please excuse any errors that have escaped final proofreading.

## 2019-05-21 NOTE — PROGRESS NOTES
Bedside and Verbal shift change report given to STEPHANIE Sanchez RN . Report included the following information SBAR, Kardex, Intake/Output, MAR and Recent Results.

## 2019-05-21 NOTE — H&P
History & Physical    Patient: Boni Holley MRN: [id number]  CSN: [id number]    YOB: 1980  Age: 41 y.o. Sex: female      DOA: 5/21/2019  Primary Care Provider:  Princess Servin MD      Assessment/Plan     Patient Active Problem List   Diagnosis Code    Hypertension I10    Bartholin's gland abscess N75.1    Renal failure N19    Obesity E66.9    Kidney disease N28.9    Rheumatoid arthritis ([facility] M06.9    Rupture of right patellar tendon K43.844L    Patellar tendon rupture, right, subsequent encounter S86.811D    Anemia D64.9       Admit to medical floor    Anemia - getting blood transfusion, secondary to ESRD, follow H/H    ESRD - HD per nephrology    HTN - continue home medications, monitor BP    Lupus/RA - continue cellcept    Calciphylaxis - local wound care. DVT prophylaxis    Estimated length of stay : 1-2    CC: anemia       HPI:     Boni Holley is a 41 y.o. female who has past history of HTN, ESRD, RA, lupus, GERD is sent to ER from dialysis center, her Hb checked today was low and she is sent to get blood transfusion. Patient as such denies any complains. She has calciphylaxis and is getting local wound care.    In ER her H/H 6.9/21.8    Past Medical History:   Diagnosis Date    Anemia 1996    Arthritis     RA    Autoimmune disease ([facility]     Lupus, RA    Bartholin cyst     Chronic kidney disease 2013    ESRD- related to the lupus, periteneal dialysis    GERD (gastroesophageal reflux disease)     Hypertension 2008       Past Surgical History:   Procedure Laterality Date    HX OTHER SURGICAL      periteneal cather placement    HX OTHER SURGICAL      Dialysis port placed and removed    HX OTHER SURGICAL      kidney biopsy    HX TONSILLECTOMY         Family History   Problem Relation Age of Onset    Hypertension Mother    [employer] Anemia Mother        Social History     Socioeconomic History    Marital status:      Spouse name: Not on file    Number of children: Not on file    Years of education: Not on file    Highest education level: Not on file   Tobacco Use    Smoking status: Never Smoker    Smokeless tobacco: Never Used   Substance and Sexual Activity    Alcohol use: No    Drug use: No    Sexual activity: Yes       Prior to Admission medications    Medication Sig Start Date End Date Taking? Authorizing Provider   minoxidil (LONITEN) 10 mg tablet Take 5 mg by mouth two (2) times a day. Indications: hypertension    Provider, Historical   oxyCODONE IR (ROXICODONE) 5 mg immediate release tablet Take 1-2 Tabs by mouth every four (4) hours as needed for Pain. Max Daily Amount: 60 mg. 10/11/18   Jillian Perez PA-C   amLODIPine (NORVASC) 10 mg tablet Take 10 mg by mouth daily. Provider, Historical   calcitRIOL (ROCALTROL) 0.5 mcg capsule Take 0.5 mcg by mouth daily. Provider, Historical   carvedilol (COREG) 25 mg tablet Take 25 mg by mouth two (2) times daily (with meals). Other, MD Сергей   sevelamer carbonate (RENVELA) 800 mg tab tab Take 800 mg by mouth three (3) times daily (with meals). Provider, Historical   mycophenolate (CELLCEPT) 500 mg tablet Take 500 mg by mouth two (2) times a day. Indications: Lupus    Provider, Historical   omeprazole (PRILOSEC) 40 mg capsule Take 40 mg by mouth daily. Pt instructed to take am of surgery. Indications: GASTROESOPHAGEAL REFLUX    Provider, Historical   cloNIDine (CATAPRES) 0.3 mg tablet Take 0.3 mg by mouth two (2) times a day. Indications: hypertension    Provider, Historical   ramipril (ALTACE) 5 mg capsule Take 5 mg by mouth daily. Indications: hypertension    Provider, Historical   furosemide (LASIX) 40 mg tablet Take 40 mg by mouth daily. Indications: EDEMA    Provider, Historical       Allergies   Allergen Reactions    Haldol [Haloperidol Lactate] Seizures       Review of Systems  Gen: No fever, chills, malaise, weight loss/gain.    Heent: No headache, rhinorrhea, epistaxis, ear pain, hearing loss, sinus pain, neck pain/stiffness, sore throat. Heart: No chest pain, palpitations, MA, pnd, or orthopnea. Resp: No cough, hemoptysis, wheezing and shortness of breath. GI: No nausea, vomiting, diarrhea, constipation, melena or hematochezia. : No urinary obstruction, dysuria or hematuria. Derm: No rash, new skin lesion or pruritis. Musc/skeletal: no bone or joint complains. Vasc: No edema, cyanosis or claudication. Endo: No heat/cold intolerance, no polyuria,polydipsia or polyphagia. Neuro: No unilateral weakness, numbness, tingling. No seizures. Heme: No easy bruising or bleeding. Physical Exam:     Physical Exam:  Visit Vitals  /83 (BP 1 Location: Right arm, BP Patient Position: At rest)   Pulse (!) 106   Temp 98 °F (36.7 °C)   Resp 16   Ht 5' 4\" (1.626 m)   Wt 68 kg (150 lb)   LMP 2019 (Within Weeks)   SpO2 100%   BMI 25.75 kg/m²      O2 Device: Room air    Temp (24hrs), Av.5 °F (36.9 °C), Min:98 °F (36.7 °C), Max:98.7 °F (37.1 °C)    No intake/output data recorded. No intake/output data recorded. General:  Awake, cooperative, no distress. Head:  Normocephalic, without obvious abnormality, atraumatic. Eyes:  Conjunctivae/corneas clear, sclera anicteric, PERRL, EOMs intact. Nose: Nares normal. No drainage or sinus tenderness. Throat: Lips, mucosa, and tongue normal.    Neck: Supple, symmetrical, trachea midline, no adenopathy. Lungs:   Clear to auscultation bilaterally. Heart:   S1, S2, no murmur, click, rub or gallop. Abdomen: Soft, non-tender. Bowel sounds normal. No masses,  No organomegaly. Extremities: Extremities normal, atraumatic, no cyanosis or edema. Capillary refill normal.   Pulses: 2+ and symmetric all extremities. Skin: Skin color pink, turgor normal. No rashes or lesions   Neurologic: CNII-XII intact. No focal motor or sensory deficit.        Labs Reviewed:    CMP:   Lab Results   Component Value Date/Time     05/21/2019 03:50 PM    K 3.0 (L) 05/21/2019 03:50 PM     05/21/2019 03:50 PM    CO2 29 05/21/2019 03:50 PM    AGAP 8 05/21/2019 03:50 PM    GLU 92 05/21/2019 03:50 PM    BUN 28 (H) 05/21/2019 03:50 PM    CREA 2.95 (H) 05/21/2019 03:50 PM    GFRAA 22 (L) 05/21/2019 03:50 PM    GFRNA 18 (L) 05/21/2019 03:50 PM    CA 7.8 (L) 05/21/2019 03:50 PM    ALB 1.7 (L) 05/21/2019 03:50 PM    TP 5.8 (L) 05/21/2019 03:50 PM    GLOB 4.1 (H) 05/21/2019 03:50 PM    AGRAT 0.4 (L) 05/21/2019 03:50 PM    SGOT 17 05/21/2019 03:50 PM    ALT 9 (L) 05/21/2019 03:50 PM     CBC:   Lab Results   Component Value Date/Time    WBC 7.1 05/21/2019 03:50 PM    HGB 6.9 (L) 05/21/2019 03:50 PM    HCT 21.8 (L) 05/21/2019 03:50 PM     05/21/2019 03:50 PM         Procedures/imaging: see electronic medical records for all procedures/Xrays and details which were not copied into this note but were reviewed prior to creation of Plan        CC: Oswaldo Bonilla MD

## 2019-05-22 VITALS
HEART RATE: 90 BPM | OXYGEN SATURATION: 99 % | WEIGHT: 150 LBS | RESPIRATION RATE: 18 BRPM | HEIGHT: 64 IN | SYSTOLIC BLOOD PRESSURE: 101 MMHG | TEMPERATURE: 97.7 F | BODY MASS INDEX: 25.61 KG/M2 | DIASTOLIC BLOOD PRESSURE: 46 MMHG

## 2019-05-22 LAB
ANION GAP SERPL CALC-SCNC: 7 MMOL/L (ref 3–18)
BUN SERPL-MCNC: 34 MG/DL (ref 7–18)
BUN/CREAT SERPL: 10 (ref 12–20)
CALCIUM SERPL-MCNC: 7.6 MG/DL (ref 8.5–10.1)
CHLORIDE SERPL-SCNC: 103 MMOL/L (ref 100–108)
CO2 SERPL-SCNC: 29 MMOL/L (ref 21–32)
CREAT SERPL-MCNC: 3.47 MG/DL (ref 0.6–1.3)
ERYTHROCYTE [DISTWIDTH] IN BLOOD BY AUTOMATED COUNT: 18.4 % (ref 11.6–14.5)
GLUCOSE SERPL-MCNC: 79 MG/DL (ref 74–99)
HCT VFR BLD AUTO: 23.5 % (ref 35–45)
HCT VFR BLD AUTO: 28.3 % (ref 35–45)
HGB BLD-MCNC: 7.6 G/DL (ref 12–16)
HGB BLD-MCNC: 9.2 G/DL (ref 12–16)
MCH RBC QN AUTO: 28.8 PG (ref 24–34)
MCHC RBC AUTO-ENTMCNC: 32.3 G/DL (ref 31–37)
MCV RBC AUTO: 89 FL (ref 74–97)
PLATELET # BLD AUTO: 237 K/UL (ref 135–420)
PMV BLD AUTO: 7.9 FL (ref 9.2–11.8)
POTASSIUM SERPL-SCNC: 3.3 MMOL/L (ref 3.5–5.5)
RBC # BLD AUTO: 2.64 M/UL (ref 4.2–5.3)
SODIUM SERPL-SCNC: 139 MMOL/L (ref 136–145)
WBC # BLD AUTO: 6.2 K/UL (ref 4.6–13.2)

## 2019-05-22 PROCEDURE — 85018 HEMOGLOBIN: CPT

## 2019-05-22 PROCEDURE — 85027 COMPLETE CBC AUTOMATED: CPT

## 2019-05-22 PROCEDURE — 74011250636 HC RX REV CODE- 250/636: Performed by: HOSPITALIST

## 2019-05-22 PROCEDURE — 80048 BASIC METABOLIC PNL TOTAL CA: CPT

## 2019-05-22 PROCEDURE — 74011250637 HC RX REV CODE- 250/637: Performed by: HOSPITALIST

## 2019-05-22 PROCEDURE — 96372 THER/PROPH/DIAG INJ SC/IM: CPT

## 2019-05-22 PROCEDURE — 36430 TRANSFUSION BLD/BLD COMPNT: CPT

## 2019-05-22 PROCEDURE — 99218 HC RM OBSERVATION: CPT

## 2019-05-22 PROCEDURE — 36415 COLL VENOUS BLD VENIPUNCTURE: CPT

## 2019-05-22 PROCEDURE — P9016 RBC LEUKOCYTES REDUCED: HCPCS

## 2019-05-22 RX ORDER — SIMETHICONE 80 MG
40 TABLET,CHEWABLE ORAL
COMMUNITY

## 2019-05-22 RX ORDER — NALOXONE HYDROCHLORIDE 0.4 MG/ML
0.4 INJECTION, SOLUTION INTRAMUSCULAR; INTRAVENOUS; SUBCUTANEOUS AS NEEDED
COMMUNITY

## 2019-05-22 RX ORDER — FENTANYL 25 UG/1
1 PATCH TRANSDERMAL
COMMUNITY

## 2019-05-22 RX ORDER — DOCUSATE SODIUM 100 MG/1
100 CAPSULE, LIQUID FILLED ORAL
COMMUNITY

## 2019-05-22 RX ORDER — PROMETHAZINE HYDROCHLORIDE 12.5 MG/1
12.5 TABLET ORAL
COMMUNITY

## 2019-05-22 RX ORDER — DIPHENHYDRAMINE HCL 25 MG
25 CAPSULE ORAL
COMMUNITY

## 2019-05-22 RX ORDER — HYDRALAZINE HYDROCHLORIDE 50 MG/1
50 TABLET, FILM COATED ORAL 3 TIMES DAILY
COMMUNITY

## 2019-05-22 RX ORDER — SODIUM CHLORIDE 9 MG/ML
250 INJECTION, SOLUTION INTRAVENOUS AS NEEDED
Status: DISCONTINUED | OUTPATIENT
Start: 2019-05-22 | End: 2019-05-22 | Stop reason: HOSPADM

## 2019-05-22 RX ORDER — LANOLIN ALCOHOL/MO/W.PET/CERES
3 CREAM (GRAM) TOPICAL
COMMUNITY

## 2019-05-22 RX ORDER — FAMOTIDINE 20 MG/1
20 TABLET, FILM COATED ORAL
COMMUNITY

## 2019-05-22 RX ORDER — ACETAMINOPHEN 325 MG/1
650 TABLET ORAL
COMMUNITY

## 2019-05-22 RX ORDER — METOPROLOL SUCCINATE 25 MG/1
25 TABLET, EXTENDED RELEASE ORAL
COMMUNITY

## 2019-05-22 RX ORDER — CINACALCET 60 MG/1
60 TABLET, FILM COATED ORAL DAILY
COMMUNITY

## 2019-05-22 RX ORDER — MAG HYDROX/ALUMINUM HYD/SIMETH 200-200-20
15 SUSPENSION, ORAL (FINAL DOSE FORM) ORAL
COMMUNITY

## 2019-05-22 RX ORDER — ONDANSETRON 4 MG/1
4 TABLET, ORALLY DISINTEGRATING ORAL
COMMUNITY

## 2019-05-22 RX ORDER — MINOXIDIL 2.5 MG/1
2.5 TABLET ORAL
COMMUNITY

## 2019-05-22 RX ADMIN — LISINOPRIL 10 MG: 5 TABLET ORAL at 09:26

## 2019-05-22 RX ADMIN — MINOXIDIL 5 MG: 2.5 TABLET ORAL at 00:13

## 2019-05-22 RX ADMIN — CLONIDINE HYDROCHLORIDE 0.3 MG: 0.1 TABLET ORAL at 09:26

## 2019-05-22 RX ADMIN — HEPARIN SODIUM 5000 UNITS: 5000 INJECTION INTRAVENOUS; SUBCUTANEOUS at 08:00

## 2019-05-22 RX ADMIN — MYCOPHENOLATE MOFETIL 500 MG: 250 CAPSULE ORAL at 09:25

## 2019-05-22 RX ADMIN — SEVELAMER CARBONATE 800 MG: 800 TABLET, FILM COATED ORAL at 13:09

## 2019-05-22 RX ADMIN — CALCITRIOL 0.5 MCG: 0.25 CAPSULE ORAL at 09:25

## 2019-05-22 RX ADMIN — FUROSEMIDE 40 MG: 40 TABLET ORAL at 09:26

## 2019-05-22 RX ADMIN — MINOXIDIL 5 MG: 2.5 TABLET ORAL at 09:25

## 2019-05-22 RX ADMIN — CARVEDILOL 25 MG: 12.5 TABLET, FILM COATED ORAL at 08:00

## 2019-05-22 RX ADMIN — AMLODIPINE BESYLATE 10 MG: 5 TABLET ORAL at 09:26

## 2019-05-22 RX ADMIN — OMEPRAZOLE 40 MG: 20 CAPSULE, DELAYED RELEASE ORAL at 09:26

## 2019-05-22 RX ADMIN — SEVELAMER CARBONATE 800 MG: 800 TABLET, FILM COATED ORAL at 08:00

## 2019-05-22 NOTE — ROUTINE PROCESS
19:45: Received verbal/bedside change of shift report from Bobby Gonzalez RN. Report included SBAR, KARDEX, MAR and recent results. 20:00: Pt received resting quietly in bed and in NAD. Respirations even and unlabored, skin W&D. Purewick in place and draining clear nigel urine. One unit of PRBC's infusing at this time. BLE wrapped in kerlix to preserve currently prescribed dressing until wound care evaluates. Denies C/O. 04:00: Hgb elevated from 6.9 to 7.6 per most recent lab draw. Bedside, Verbal and Written shift change report given to CONCEPCIÓN Riley (oncoming nurse) by Jolie Wynne (offgoing nurse). Report included the following information SBAR, Kardex, MAR and Recent Results.

## 2019-05-22 NOTE — PROGRESS NOTES
DC Plan: [facility] [place], [place]. [phone] for report. Please include all hard scripts for controlled substances, med rec and dc summary in packet. Please medicate for pain prior to dc if possible and needed to help offset delay when patient first arrives to facility. Chart reviewed. Pt admitted in obs status for anemia. Met with pt at bedside. Pt is from [facility] for skilled. Pt states she has been there several weeks and plans to return. FOC offered. Pt states she typically lives with her . Pt goes to dialysis @ [facility]. Pt states she uses [place] transport to dialysis from rehab. Pt denies using DME when at home. Pt states she has a skin graft scheduled next week. No other dc concerns identified. Pt has been accepted back to the [facility] once discharged.     6821  Unit secretary will arrange medical transport for 5p    [phone]  Primary RN Nishant Meade aware of transport time of 5p, will page CM if transport needs to be delayed [phone]    Reason for Admission:   anemia                   RRAT Score:      7 low               Plan for utilizing home health:    SNF                      Current Advanced Directive/Advance Care Plan: Not on file    Likelihood of Readmission:  low                         Transition of Care Plan:       [facility] rehab               Care Management Interventions  Mode of Transport at Discharge: BLS  Transition of Care Consult (CM Consult): Discharge Planning, SNF  Current Support Network: Lives with Spouse(came from rehab)  Plan discussed with Pt/Family/Caregiver: Yes  Freedom of Choice Offered: Yes  Discharge Location  Discharge Placement: Skilled nursing facility

## 2019-05-22 NOTE — PROGRESS NOTES
Received bedside report from night shift RN. Patient sleeping. Patient alert and oriented x 4. Bilateral thighs wrapped in Kerlex. Patient states she wants the wound care nurse at the [facility] where she is staying to rewrap with medihoney only, which the hospital does not carry. [address] Dr [employer] in to assess patient, stated she will have another unit of blood, repeat H&H and she can leave if results WNL. 1200 unit of blood started. No s/s of transfusion reaction. [address] [facility] [address] [employer] notified of recent H&H. Patient okay to be discharged as scheduled. Awaiting medical transport at 1700. Patient Vitals for the past 12 hrs:   Temp Pulse Resp BP SpO2   05/22/19 1550 97.7 °F (36.5 °C) 90 18 101/46 99 %   05/22/19 1300 97.7 °F (36.5 °C) 85 18 102/45 100 %   05/22/19 1234 97.6 °F (36.4 °C) 87 17 102/44 100 %   05/22/19 1215 97.7 °F (36.5 °C) 89 17 104/45 100 %   05/22/19 1200 97.6 °F (36.4 °C) 89 17 108/46 100 %   05/22/19 1129 97.7 °F (36.5 °C) 91 18 118/42 96 %   05/22/19 0751 98 °F (36.7 °C) (!) 108 18 138/72 100 %     SBAR given to aPz Lockhart at [address]. Patient awaiting medical transport. [facility] transport taking patient.

## 2019-05-22 NOTE — DISCHARGE SUMMARY
Discharge Summary    Patient: Madhu Blevins MRN: [id number]  CSN: [id number]    YOB: 1980  Age: 41 y.o. Sex: female    DOA: 5/21/2019 LOS:  LOS: 0 days   Discharge Date:      Primary Care Provider:  Tatianna Beal MD    Admission Diagnoses: Anemia [D64.9]    Discharge Diagnoses:    Problem List as of 5/22/2019 Date Reviewed: 10/9/2018          Codes Class Noted - Resolved    Hypertension ICD-10-CM: I10  ICD-9-CM: 401.9  3/27/2014 - Present        Bartholin's gland abscess ICD-10-CM: N75.1  ICD-9-CM: 616.3  3/27/2014 - Present        Renal failure ICD-10-CM: N19  ICD-9-CM: 017  3/27/2014 - Present        Obesity ICD-10-CM: E66.9  ICD-9-CM: 278.00  3/27/2014 - Present        * (Principal) Anemia ICD-10-CM: D64.9  ICD-9-CM: 285.9  5/21/2019 - Present        Patellar tendon rupture, right, subsequent encounter ICD-10-CM: S86.811D  ICD-9-CM: V58.89, 844.8  10/11/2018 - Present        Kidney disease (Chronic) ICD-10-CM: N28.9  ICD-9-CM: 593.9  9/26/2018 - Present        Rheumatoid arthritis ([facility] [address] (Chronic) ICD-10-CM: M06.9  ICD-9-CM: 714.0  9/26/2018 - Present        Rupture of right patellar tendon ICD-10-CM: T99.424R  ICD-9-CM: 844.8  9/26/2018 - Present              Discharge Medications:     Current Discharge Medication List      CONTINUE these medications which have NOT CHANGED    Details   minoxidil (LONITEN) 10 mg tablet Take 5 mg by mouth two (2) times a day. Indications: hypertension      oxyCODONE IR (ROXICODONE) 5 mg immediate release tablet Take 1-2 Tabs by mouth every four (4) hours as needed for Pain. Max Daily Amount: 60 mg.  Qty: 60 Tab, Refills: 0    Associated Diagnoses: Patellar tendon rupture, right, subsequent encounter      amLODIPine (NORVASC) 10 mg tablet Take 10 mg by mouth daily. calcitRIOL (ROCALTROL) 0.5 mcg capsule Take 0.5 mcg by mouth daily. carvedilol (COREG) 25 mg tablet Take 25 mg by mouth two (2) times daily (with meals).       sevelamer carbonate (RENVELA) 800 mg tab tab Take 800 mg by mouth three (3) times daily (with meals). mycophenolate (CELLCEPT) 500 mg tablet Take 500 mg by mouth two (2) times a day. Indications: Lupus      omeprazole (PRILOSEC) 40 mg capsule Take 40 mg by mouth daily. Pt instructed to take am of surgery. Indications: GASTROESOPHAGEAL REFLUX      cloNIDine (CATAPRES) 0.3 mg tablet Take 0.3 mg by mouth two (2) times a day. Indications: hypertension      ramipril (ALTACE) 5 mg capsule Take 5 mg by mouth daily. Indications: hypertension      furosemide (LASIX) 40 mg tablet Take 40 mg by mouth daily. Indications: EDEMA             Discharge Condition: Good          PHYSICAL EXAM   Visit Vitals  /45 (BP 1 Location: Right arm, BP Patient Position: At rest)   Pulse 85   Temp 97.7 °F (36.5 °C)   Resp 18   Ht 5' 4\" (1.626 m)   Wt 68 kg (150 lb)   SpO2 100%   BMI 25.75 kg/m²     General: Awake, cooperative, no acute distress    HEENT: NC, Atraumatic. PERRLA, EOMI. Anicteric sclerae. Lungs:  CTA Bilaterally. No Wheezing/Rhonchi/Rales. Heart:  Regular  rhythm,  No murmur, No Rubs, No Gallops  Abdomen: Soft, Non distended, Non tender. +Bowel sounds,   Extremities: No c/c/e  Psych:   Not anxious or agitated. Neurologic:  No acute neurological deficits. Open wound thigh bilaterally                              Admission HPI : Ap Tabor is a 41 y.o. female who has past history of HTN, ESRD, RA, lupus, GERD is sent to ER from dialysis center, her Hb checked today was low and she is sent to get blood transfusion. Patient as such denies any complains. She has calciphylaxis and is getting local wound care. In ER her H/H 6.9/21.8        Hospital Course :   Ms. Maci Middleton was admitted to medical floor. She was started on blood transfusion, her H/H after one unit of blood her H/H 7.6/23.5, she has plastic surgery scheduled next week for skin graft, she will benefit from one more unit. She can be discharged after one more unit of blood.    For her chronic stable medical problems, we continued her home medications. Activity: Activity as tolerated    Diet: Renal Diet    Follow-up: PCP, nephrology, plastic surgery    Disposition: SNF    Minutes spent on discharge: 40       Labs: Results:       Chemistry Recent Labs     05/22/19  0215 05/21/19  1550   GLU 79 92    139   K 3.3* 3.0*    102   CO2 29 29   BUN 34* 28*   CREA 3.47* 2.95*   CA 7.6* 7.8*   AGAP 7 8   BUCR 10* 9*   AP  --  101   TP  --  5.8*   ALB  --  1.7*   GLOB  --  4.1*   AGRAT  --  0.4*      CBC w/Diff Recent Labs     05/22/19 0215 05/21/19  1550   WBC 6.2 7.1   RBC 2.64* 2.34*   HGB 7.6* 6.9*   HCT 23.5* 21.8*    356   GRANS  --  65   LYMPH  --  23   EOS  --  3      Cardiac Enzymes No results for input(s): CPK, CKND1, BRUCE in the last 72 hours. No lab exists for component: CKRMB, TROIP   Coagulation No results for input(s): PTP, INR, APTT in the last 72 hours. No lab exists for component: INREXT    Lipid Panel No results found for: CHOL, CHOLPOCT, CHOLX, CHLST, CHOLV, 522045, HDL, LDL, LDLC, DLDLP, 666809, VLDLC, VLDL, TGLX, TRIGL, TRIGP, TGLPOCT, CHHD, CHHDX   BNP No results for input(s): BNPP in the last 72 hours. Liver Enzymes Recent Labs     05/21/19  1550   TP 5.8*   ALB 1.7*      SGOT 17      Thyroid Studies No results found for: T4, T3U, TSH, TSHEXT         Significant Diagnostic Studies: No results found. No results found for this or any previous visit.         CC: Chaitanya Walker MD

## 2019-05-22 NOTE — PROGRESS NOTES
Admission Medication Reconciliation has been performed on this patient  admitted through the ED  yesterday, consisting of interview of the patient regarding their PTA Home Medication List, Allergies and PMH as well as obtaining outpatient pharmacy information. Chief complaint:   Chief Complaint   Patient presents with    Other       Pt has PMH of   Past Medical History:   Diagnosis Date    Anemia 1996    Arthritis     RA    Autoimmune disease ([facility] [address]     Lupus, RA    Bartholin cyst     Chronic kidney disease 2013    ESRD- related to the lupus, periteneal dialysis    GERD (gastroesophageal reflux disease)     Hypertension 2008       Allergies consist of: Haldol [haloperidol lactate]    Pt is from the [facility] and Med list sent w/ pt. Updated PAML accordingly and TigerTexted Dr. Larry Bolden as clinically significant changes were made to the PTA Med List after Admission orders were written. Discharge summary already completed as well, pt is to return to the [facility]. PAML was not marked complete and did  require modification. Admission orders have  already been written. Medication Compliance Issues and/or Medication Concerns:     Scanned medlist found on paper chart into EHR.   Added to [facility]: apap, cinacalet, nephrocaps, pepcid, fentanyl patch, sae-bid, melatonin, toprol xl, simethicone, rulox, benadryl, zofran, colace, phenergan, hydralazine, saline nasal spray     Removed from PAML: norvasc, rocaltrol, coreg, catapress, lasix, altace    Modified dose/freq of PAML entry: renvela, minoxidil              Monae nunavut    Clinical Pharmacist  [phone]

## 2019-05-23 LAB
ABO + RH BLD: NORMAL
BLD PROD TYP BPU: NORMAL
BLD PROD TYP BPU: NORMAL
BLOOD BANK CMNT PATIENT-IMP: NORMAL
BLOOD GROUP ANTIBODIES SERPL: NORMAL
BPU ID: NORMAL
BPU ID: NORMAL
CALLED TO:,BCALL1: NORMAL
CALLED TO:,BCALL2: NORMAL
CROSSMATCH RESULT,%XM: NORMAL
CROSSMATCH RESULT,%XM: NORMAL
SPECIMEN EXP DATE BLD: NORMAL
STATUS OF UNIT,%ST: NORMAL
STATUS OF UNIT,%ST: NORMAL
UNIT DIVISION, %UDIV: 0
UNIT DIVISION, %UDIV: 0

## 2022-03-19 PROBLEM — S86.811D: Status: ACTIVE | Noted: 2018-10-11

## 2022-03-19 PROBLEM — M06.9 RHEUMATOID ARTHRITIS (HCC): Status: ACTIVE | Noted: 2018-09-26

## 2022-03-19 PROBLEM — D64.9 ANEMIA: Status: ACTIVE | Noted: 2019-05-21

## 2022-03-19 PROBLEM — S86.811A RUPTURE OF RIGHT PATELLAR TENDON: Status: ACTIVE | Noted: 2018-09-26

## 2022-03-20 PROBLEM — N28.9 KIDNEY DISEASE: Status: ACTIVE | Noted: 2018-09-26

## 2023-05-17 PROBLEM — I50.9 CHF (CONGESTIVE HEART FAILURE) (HCC): Status: ACTIVE | Noted: 2023-05-17

## 2024-05-03 ENCOUNTER — OFFICE VISIT (OUTPATIENT)
Age: 44
End: 2024-05-03
Payer: MEDICAID

## 2024-05-03 VITALS
TEMPERATURE: 98 F | SYSTOLIC BLOOD PRESSURE: 156 MMHG | BODY MASS INDEX: 29.91 KG/M2 | DIASTOLIC BLOOD PRESSURE: 57 MMHG | HEIGHT: 64 IN | RESPIRATION RATE: 17 BRPM | WEIGHT: 175.2 LBS

## 2024-05-03 DIAGNOSIS — N18.6 ESRD ON HEMODIALYSIS (HCC): ICD-10-CM

## 2024-05-03 DIAGNOSIS — Z99.2 ESRD ON HEMODIALYSIS (HCC): ICD-10-CM

## 2024-05-03 DIAGNOSIS — Z12.31 SCREENING MAMMOGRAM FOR BREAST CANCER: ICD-10-CM

## 2024-05-03 DIAGNOSIS — Z01.419 ENCOUNTER FOR GYNECOLOGICAL EXAMINATION: ICD-10-CM

## 2024-05-03 DIAGNOSIS — E55.9 VITAMIN D DEFICIENCY: ICD-10-CM

## 2024-05-03 DIAGNOSIS — Z00.00 ENCOUNTER FOR MEDICAL EXAMINATION TO ESTABLISH CARE: Primary | ICD-10-CM

## 2024-05-03 DIAGNOSIS — R14.0 ABDOMINAL DISTENSION: ICD-10-CM

## 2024-05-03 DIAGNOSIS — Z13.220 LIPID SCREENING: ICD-10-CM

## 2024-05-03 PROBLEM — I50.9 CHF (CONGESTIVE HEART FAILURE) (HCC): Status: RESOLVED | Noted: 2023-05-17 | Resolved: 2024-05-03

## 2024-05-03 PROCEDURE — 99204 OFFICE O/P NEW MOD 45 MIN: CPT | Performed by: INTERNAL MEDICINE

## 2024-05-03 RX ORDER — SUCROFERRIC OXYHYDROXIDE 500 MG/1
1 TABLET, CHEWABLE ORAL
COMMUNITY
Start: 2023-10-10

## 2024-05-03 RX ORDER — METOPROLOL SUCCINATE 25 MG/1
75 TABLET, EXTENDED RELEASE ORAL DAILY
COMMUNITY
Start: 2019-03-15

## 2024-05-03 RX ORDER — LANTHANUM CARBONATE 1000 MG/1
TABLET, CHEWABLE ORAL
COMMUNITY
Start: 2024-02-08

## 2024-05-03 RX ORDER — SERTRALINE HYDROCHLORIDE 25 MG/1
25 TABLET, FILM COATED ORAL DAILY
COMMUNITY

## 2024-05-03 RX ORDER — ONDANSETRON 4 MG/1
4 TABLET, ORALLY DISINTEGRATING ORAL EVERY 8 HOURS PRN
COMMUNITY

## 2024-05-03 RX ORDER — HYDRALAZINE HYDROCHLORIDE 100 MG/1
100 TABLET, FILM COATED ORAL 3 TIMES DAILY
COMMUNITY

## 2024-05-03 RX ORDER — CINACALCET 60 MG/1
60 TABLET, FILM COATED ORAL DAILY
COMMUNITY
Start: 2019-03-15

## 2024-05-03 RX ORDER — VALSARTAN 160 MG/1
160 TABLET ORAL 2 TIMES DAILY
COMMUNITY

## 2024-05-03 RX ORDER — CALCITRIOL 0.25 UG/1
0.25 CAPSULE, LIQUID FILLED ORAL DAILY
COMMUNITY
Start: 2023-11-03

## 2024-05-03 RX ORDER — OMEPRAZOLE 40 MG/1
1 CAPSULE, DELAYED RELEASE ORAL DAILY
COMMUNITY
Start: 2016-10-21

## 2024-05-03 RX ORDER — HYDROXYZINE HYDROCHLORIDE 10 MG/1
10 TABLET, FILM COATED ORAL 3 TIMES DAILY PRN
COMMUNITY
Start: 2023-01-13

## 2024-05-03 RX ORDER — SIMETHICONE 80 MG
40 TABLET,CHEWABLE ORAL EVERY 6 HOURS PRN
COMMUNITY
Start: 2019-03-15

## 2024-05-03 RX ORDER — CLONIDINE HYDROCHLORIDE 0.3 MG/1
0.3 TABLET ORAL 3 TIMES DAILY
COMMUNITY
Start: 2011-07-06

## 2024-05-03 RX ORDER — SEVELAMER CARBONATE 800 MG/1
TABLET, FILM COATED ORAL
COMMUNITY
Start: 2018-04-24

## 2024-05-03 SDOH — ECONOMIC STABILITY: HOUSING INSECURITY
IN THE LAST 12 MONTHS, WAS THERE A TIME WHEN YOU DID NOT HAVE A STEADY PLACE TO SLEEP OR SLEPT IN A SHELTER (INCLUDING NOW)?: NO

## 2024-05-03 SDOH — ECONOMIC STABILITY: FOOD INSECURITY: WITHIN THE PAST 12 MONTHS, YOU WORRIED THAT YOUR FOOD WOULD RUN OUT BEFORE YOU GOT MONEY TO BUY MORE.: NEVER TRUE

## 2024-05-03 SDOH — ECONOMIC STABILITY: INCOME INSECURITY: HOW HARD IS IT FOR YOU TO PAY FOR THE VERY BASICS LIKE FOOD, HOUSING, MEDICAL CARE, AND HEATING?: NOT HARD AT ALL

## 2024-05-03 SDOH — ECONOMIC STABILITY: FOOD INSECURITY: WITHIN THE PAST 12 MONTHS, THE FOOD YOU BOUGHT JUST DIDN'T LAST AND YOU DIDN'T HAVE MONEY TO GET MORE.: NEVER TRUE

## 2024-05-03 ASSESSMENT — PATIENT HEALTH QUESTIONNAIRE - PHQ9
SUM OF ALL RESPONSES TO PHQ QUESTIONS 1-9: 0
SUM OF ALL RESPONSES TO PHQ QUESTIONS 1-9: 0
SUM OF ALL RESPONSES TO PHQ9 QUESTIONS 1 & 2: 0
SUM OF ALL RESPONSES TO PHQ QUESTIONS 1-9: 0
SUM OF ALL RESPONSES TO PHQ QUESTIONS 1-9: 0
2. FEELING DOWN, DEPRESSED OR HOPELESS: NOT AT ALL
1. LITTLE INTEREST OR PLEASURE IN DOING THINGS: NOT AT ALL

## 2024-05-03 NOTE — PROGRESS NOTES
Chief Complaint   Patient presents with    Establish Care     \"Have you been to the ER, urgent care clinic since your last visit?  Hospitalized since your last visit?\"    NO    “Have you seen or consulted any other health care providers outside of [facility] since your last visit?”    New patient     “Have you had a pap smear?”    NO    No cervical cancer screening on file             Click Here for Release of Records Request    
Spoke to IR and they stated that the provider suggests an US of the abdomen and to place an order for a consult for paracentesis and they will order one if needed. Advised Krysten of this, orders placed, patient advised and number to central schedule given to schedule ultrasound.   
    Krysten Hinojosa, APRN - NP      The patient (or guardian, if applicable) and other individuals in attendance with the patient were advised that Artificial Intelligence will be utilized during this visit to record and process the conversation to generate a clinical note. The patient (or guardian, if applicable) and other individuals in attendance at the appointment consented to the use of AI, including the recording.

## 2024-05-04 LAB
25(OH)D3+25(OH)D2 SERPL-MCNC: 11.2 NG/ML (ref 30–100)
CHOLEST SERPL-MCNC: 155 MG/DL (ref 100–199)
HDLC SERPL-MCNC: 59 MG/DL
IMP & REVIEW OF LAB RESULTS: NORMAL
LDLC SERPL CALC-MCNC: 86 MG/DL (ref 0–99)
TRIGL SERPL-MCNC: 48 MG/DL (ref 0–149)
TSH SERPL DL<=0.005 MIU/L-ACNC: 1.63 UIU/ML (ref 0.45–4.5)
VLDLC SERPL CALC-MCNC: 10 MG/DL (ref 5–40)

## 2024-05-06 DIAGNOSIS — E55.9 VITAMIN D DEFICIENCY: Primary | ICD-10-CM

## 2024-05-06 RX ORDER — ERGOCALCIFEROL 1.25 MG/1
50000 CAPSULE ORAL WEEKLY
Qty: 4 CAPSULE | Refills: 2 | Status: SHIPPED | OUTPATIENT
Start: 2024-05-06

## 2024-05-06 NOTE — TELEPHONE ENCOUNTER
Called 08:17 am  Zainab boyle    ----- Message from RIP Silva NP sent at 5/4/2024  9:50 AM EDT -----  Vitamin d low, send in [id number] weekly for 12 weeks and then take OTC daily   TSH and Lipids stable

## 2024-07-29 DIAGNOSIS — E55.9 VITAMIN D DEFICIENCY: ICD-10-CM

## 2024-07-29 RX ORDER — ERGOCALCIFEROL 1.25 MG/1
50000 CAPSULE ORAL WEEKLY
Qty: 4 CAPSULE | Refills: 1 | Status: SHIPPED | OUTPATIENT
Start: 2024-07-29

## 2024-08-26 DIAGNOSIS — E55.9 VITAMIN D DEFICIENCY: ICD-10-CM

## 2024-08-26 RX ORDER — ERGOCALCIFEROL 1.25 MG/1
50000 CAPSULE, LIQUID FILLED ORAL WEEKLY
Qty: 12 CAPSULE | Refills: 1 | Status: SHIPPED | OUTPATIENT
Start: 2024-08-26

## 2024-10-11 RX ORDER — HYDROXYZINE HYDROCHLORIDE 10 MG/1
10 TABLET, FILM COATED ORAL 3 TIMES DAILY PRN
Qty: 90 TABLET | Refills: 2 | Status: SHIPPED | OUTPATIENT
Start: 2024-10-11

## 2024-10-11 NOTE — TELEPHONE ENCOUNTER
[id number] - [place], [place] - [address] -  [phone] - F [phone]     hydrOXYzine HCl (ATARAX) 10 MG tablet     05/03/2024 11/01/2024

## 2024-11-04 RX ORDER — HYDROXYZINE HYDROCHLORIDE 10 MG/1
10 TABLET, FILM COATED ORAL 3 TIMES DAILY PRN
Qty: 270 TABLET | Refills: 1 | OUTPATIENT
Start: 2024-11-04

## 2024-12-09 ENCOUNTER — OFFICE VISIT (OUTPATIENT)
Age: 44
End: 2024-12-09

## 2024-12-09 VITALS
DIASTOLIC BLOOD PRESSURE: 68 MMHG | SYSTOLIC BLOOD PRESSURE: 146 MMHG | BODY MASS INDEX: 30.05 KG/M2 | RESPIRATION RATE: 18 BRPM | WEIGHT: 176 LBS | HEIGHT: 64 IN

## 2024-12-09 DIAGNOSIS — Z00.00 MEDICARE ANNUAL WELLNESS VISIT, SUBSEQUENT: Primary | ICD-10-CM

## 2024-12-09 DIAGNOSIS — F32.A ANXIETY AND DEPRESSION: ICD-10-CM

## 2024-12-09 DIAGNOSIS — F41.9 ANXIETY AND DEPRESSION: ICD-10-CM

## 2024-12-09 DIAGNOSIS — Z99.2 ESRD ON HEMODIALYSIS (HCC): ICD-10-CM

## 2024-12-09 DIAGNOSIS — N18.6 ESRD ON HEMODIALYSIS (HCC): ICD-10-CM

## 2024-12-09 DIAGNOSIS — E66.811 OBESITY (BMI 30.0-34.9): ICD-10-CM

## 2024-12-09 DIAGNOSIS — I10 HYPERTENSION, UNSPECIFIED TYPE: ICD-10-CM

## 2024-12-09 DIAGNOSIS — Z01.419 ENCOUNTER FOR GYNECOLOGICAL EXAMINATION: ICD-10-CM

## 2024-12-09 RX ORDER — FOLIC ACID/VIT B COMPLEX AND C 0.8 MG
1 TABLET ORAL DAILY
COMMUNITY
Start: 2024-10-07

## 2024-12-09 NOTE — PROGRESS NOTES
Chief Complaint   Patient presents with    Hypertension     \"Have you been to the ER, urgent care clinic since your last visit?  Hospitalized since your last visit?\"    Yes  dialysis    “Have you seen or consulted any other health care providers outside our system since your last visit?”    NO    Have you had a mammogram?”   NO    Date of last Mammogram: 12/23/2021      “Have you had a pap smear?”    NO    No cervical cancer screening on file              
patient instructions/AVS.  Recommended screening schedule for the next 5-10 years is provided to the patient in written form: see Patient Instructions/AVS.         Return in about 6 months (around 6/9/2025) for follow up if symptoms persist, follow up for routine visit or before if needed.     Subjective   History of Present Illness  The patient presents for a follow-up visit. She is accompanied by her .    She reports feeling well overall. She has initiated the process for a kidney transplant and has had her first appointment. She is currently undergoing hemodialysis three times a week, with 3 to 4 liters of fluid removed each session. Her nephrologist is Dr. Manrique. A recent CT scan showed no fluid in her abdomen. There have been some changes to her medication regimen, including the temporary discontinuation of vitamin D and calcitriol, and a switch from Renvela to lanthanum. She is currently taking clonidine 0.3 mg three times a day, hydralazine 100 mg three times a day, metoprolol 25 mg three times a day, and valsartan 160 mg twice a day for blood pressure management.    She reports that her mental health has improved significantly with the use of Zoloft 25 mg, which she takes for both anxiety and depression. She has not experienced any recent falls.    She has requested a referral to a gynecologist and for a mammogram. However, she has not yet seen a gynecologist due to scheduling issues. She has expressed interest in receiving the shingles vaccine.    She had a partial hysterectomy.        Patient's complete Health Risk Assessment and screening values have been reviewed and are found in Flowsheets. The following problems were reviewed today and where indicated follow up appointments were made and/or referrals ordered.    Positive Risk Factor Screenings with Interventions:                Abnormal BMI (obese):  Body mass index is 30.21 kg/m². (!) Abnormal    Interventions:  low carbohydrate diet,

## 2025-01-06 RX ORDER — HYDROXYZINE HYDROCHLORIDE 10 MG/1
10 TABLET, FILM COATED ORAL 3 TIMES DAILY PRN
Qty: 90 TABLET | Refills: 2 | Status: SHIPPED | OUTPATIENT
Start: 2025-01-06

## 2025-01-06 NOTE — TELEPHONE ENCOUNTER
Last appt 12/9/2024      Next Apt:     Future Appointments   Date Time Provider Department Center   1/14/2025  2:15 PM [facility] [address] [facility]         [id number] - [place], [place] - [address] -  [phone] - F [phone] [address] [address]  Phone: [phone] Fax: [phone]

## 2025-05-27 ENCOUNTER — PATIENT MESSAGE (OUTPATIENT)
Age: 45
End: 2025-05-27

## 2025-05-27 RX ORDER — HYDROXYZINE HYDROCHLORIDE 10 MG/1
10 TABLET, FILM COATED ORAL 3 TIMES DAILY PRN
Qty: 90 TABLET | Refills: 0 | Status: SHIPPED | OUTPATIENT
Start: 2025-05-27

## 2025-07-18 RX ORDER — HYDROXYZINE HYDROCHLORIDE 10 MG/1
10 TABLET, FILM COATED ORAL 3 TIMES DAILY PRN
Qty: 90 TABLET | Refills: 0 | OUTPATIENT
Start: 2025-07-18

## 2025-08-11 RX ORDER — HYDROXYZINE HYDROCHLORIDE 10 MG/1
10 TABLET, FILM COATED ORAL 3 TIMES DAILY PRN
Qty: 90 TABLET | Refills: 0 | OUTPATIENT
Start: 2025-08-11

## (undated) DEVICE — DISPOSABLE TOURNIQUET CUFF SINGLE BLADDER, SINGLE PORT AND QUICK CONNECT CONNECTOR: Brand: COLOR CUFF

## (undated) DEVICE — STERILE POLYISOPRENE POWDER-FREE SURGICAL GLOVES WITH EMOLLIENT COATING: Brand: PROTEXIS

## (undated) DEVICE — GOWN,SIRUS,NONRNF,SETINSLV,2XL,18/CS: Brand: MEDLINE

## (undated) DEVICE — SUT ETHBND 1 30IN OS8 GRN --

## (undated) DEVICE — UNDERCAST PADDING: Brand: DEROYAL

## (undated) DEVICE — SUT FIBERTAPE 2MM 54IN 2 8IN --

## (undated) DEVICE — INTENT TO BE USED WITH SUTURE MATERIAL FOR TISSUE CLOSURE: Brand: RICHARD-ALLAN® NEEDLE 1/2 CIRCLE TAPER

## (undated) DEVICE — SUTURE PROL SZ 3-0 L18IN NONABSORBABLE BLU L19MM PC-5 3/8 8632G

## (undated) DEVICE — KNEE ARTHROSCOPY III-LF: Brand: MEDLINE INDUSTRIES, INC.

## (undated) DEVICE — SPONGE GZ W4XL4IN COT 12 PLY TYP VII WVN C FLD DSGN

## (undated) DEVICE — STERILE POLYISOPRENE POWDER-FREE SURGICAL GLOVES: Brand: PROTEXIS

## (undated) DEVICE — DRESSING,GAUZE,XEROFORM,CURAD,5"X9",ST: Brand: CURAD

## (undated) DEVICE — TRAY PREP DRY W/ PREM GLV 2 APPL 6 SPNG 2 UNDPD 1 OVERWRAP

## (undated) DEVICE — KENDALL SCD EXPRESS SLEEVES, KNEE LENGTH, MEDIUM: Brand: KENDALL SCD

## (undated) DEVICE — SUTURE 2-0 VCRL CTD FS-1 J443H

## (undated) DEVICE — BANDAGE COMPR W6INXL3YD EXSANGUATION 1 PLY ESMARCH

## (undated) DEVICE — SKIN MARKER,REGULAR TIP WITH RULER AND LABELS: Brand: DEVON

## (undated) DEVICE — BANDAGE COMPR SGL LAYERED CLP CLSR ELAS 15FT LEN 6IN W